# Patient Record
Sex: FEMALE | Race: WHITE | NOT HISPANIC OR LATINO | Employment: FULL TIME | URBAN - METROPOLITAN AREA
[De-identification: names, ages, dates, MRNs, and addresses within clinical notes are randomized per-mention and may not be internally consistent; named-entity substitution may affect disease eponyms.]

---

## 2017-01-16 ENCOUNTER — OFFICE VISIT (OUTPATIENT)
Dept: OBSTETRICS AND GYNECOLOGY | Facility: CLINIC | Age: 37
End: 2017-01-16
Payer: COMMERCIAL

## 2017-01-16 VITALS
BODY MASS INDEX: 28.29 KG/M2 | SYSTOLIC BLOOD PRESSURE: 122 MMHG | WEIGHT: 159.63 LBS | HEIGHT: 63 IN | DIASTOLIC BLOOD PRESSURE: 68 MMHG

## 2017-01-16 DIAGNOSIS — O26.899 VAGINAL DISCHARGE DURING PREGNANCY, UNSPECIFIED TRIMESTER: ICD-10-CM

## 2017-01-16 DIAGNOSIS — F41.9 ANXIETY: ICD-10-CM

## 2017-01-16 DIAGNOSIS — N89.8 VAGINAL DISCHARGE DURING PREGNANCY, UNSPECIFIED TRIMESTER: ICD-10-CM

## 2017-01-16 DIAGNOSIS — Z34.90 PREGNANCY, UNSPECIFIED GESTATIONAL AGE: Primary | ICD-10-CM

## 2017-01-16 DIAGNOSIS — R00.0 TACHYCARDIA: ICD-10-CM

## 2017-01-16 DIAGNOSIS — Z12.4 ROUTINE PAPANICOLAOU SMEAR: ICD-10-CM

## 2017-01-16 PROCEDURE — 80307 DRUG TEST PRSMV CHEM ANLYZR: CPT

## 2017-01-16 PROCEDURE — 87480 CANDIDA DNA DIR PROBE: CPT

## 2017-01-16 PROCEDURE — 99395 PREV VISIT EST AGE 18-39: CPT | Mod: S$GLB,,, | Performed by: OBSTETRICS & GYNECOLOGY

## 2017-01-16 PROCEDURE — 87086 URINE CULTURE/COLONY COUNT: CPT

## 2017-01-16 PROCEDURE — 87591 N.GONORRHOEAE DNA AMP PROB: CPT

## 2017-01-16 PROCEDURE — 88175 CYTOPATH C/V AUTO FLUID REDO: CPT

## 2017-01-16 PROCEDURE — 99999 PR PBB SHADOW E&M-EST. PATIENT-LVL III: CPT | Mod: PBBFAC,,, | Performed by: OBSTETRICS & GYNECOLOGY

## 2017-01-16 NOTE — PROGRESS NOTES
CC: Annual check-up    SUBJECTIVE:   36 y.o. female   for annual routine Pap and checkup. Patient's last menstrual period was 2016 (exact date).. EGA 9 wks and EDC is17.  She complains of Tachycardia with PVC's and had a - w/u, also has anxiety and insomnia.        Past Medical History   Diagnosis Date    Tachycardia      Past Surgical History   Procedure Laterality Date     section       x2    Tonsillectomy      Fatty tumor removal       Social History     Social History    Marital status:      Spouse name: N/A    Number of children: N/A    Years of education: N/A     Occupational History    Not on file.     Social History Main Topics    Smoking status: Never Smoker    Smokeless tobacco: Not on file    Alcohol use No      Comment: rarely    Drug use: No    Sexual activity: Yes     Partners: Male     Other Topics Concern    Not on file     Social History Narrative     Family History   Problem Relation Age of Onset    Heart disease Paternal Grandmother     Cancer Maternal Grandmother     Uterine cancer Maternal Grandmother     Hypertension Father     Hypertension Mother      OB History    Para Term  AB SAB TAB Ectopic Multiple Living   2 2 2             # Outcome Date GA Lbr Ford/2nd Weight Sex Delivery Anes PTL Lv   2 Term    3.345 kg (7 lb 6 oz) M CS-LTranv      1 Term    2.58 kg (5 lb 11 oz) M CS-LTranv               No current outpatient prescriptions on file.     No current facility-administered medications for this visit.      Allergies: Strawberries [strawberry] and Penicillins     ROS:  Constitutional: no weight loss, weight gain, fever, fatigue  Eyes:  No vision changes, glasses/contacts  ENT/Mouth: No ulcers, sinus problems, ears ringing, headache  Cardiovascular: No inability to lie flat, chest pain, exercise intolerance, swelling, heart palpitations  Respiratory: No wheezing, coughing blood, shortness of breath, or cough  Gastrointestinal:  "No diarrhea, bloody stool, nausea/vomiting, constipation, gas, hemorrhoids  Genitourinary: No blood in urine, painful urination, urgency of urination, frequency of urination, incomplete emptying, incontinence, abnormal bleeding, painful periods, heavy periods, vaginal discharge, vaginal odor, painful intercourse, sexual problems, bleeding after intercourse.  Musculoskeletal: No muscle weakness  Skin/Breast: No painful breasts, nipple discharge, masses, rash, ulcers  Neurological: No passing out, seizures, numbness, headache  Endocrine: No diabetes, hypothyroid, hyperthyroid, hot flashes, hair loss, abnormal hair growth, ance  Psychiatric: No depression, crying  Hematologic: No bruises, bleeding, swollen lymph nodes, anemia.      OBJECTIVE:   The patient appears well, alert, oriented x 3, in no distress.  Visit Vitals    /68    Ht 5' 3" (1.6 m)    Wt 72.4 kg (159 lb 9.6 oz)    LMP 11/14/2016 (Exact Date)    BMI 28.27 kg/m2     NECK: no thyromegaly, trachea midline  SKIN: no acne, striae, hirsutism  BREAST EXAM: breasts appear normal, no suspicious masses, no skin or nipple changes or axillary nodes  ABDOMEN: no hernias, masses, or hepatosplenomegaly  GENITALIA: normal external genitalia, no erythema, no discharge  URETHRA: normal urethra, normal urethral meatus  VAGINA: Normal  CERVIX: no lesions or cervical motion tenderness  UTERUS: enlarged, 10 weeks size  ADNEXA: normal adnexa      ASSESSMENT:   well woman  1. Pregnancy, unspecified gestational age    2. Routine Papanicolaou smear    3. Vaginal discharge during pregnancy, unspecified trimester    4. Tachycardia    5. Anxiety        PLAN:   pap smear  return after y/s  Orders Placed This Encounter    Urine culture    C. trachomatis/N. gonorrhoeae by AMP DNA Urine    Vaginosis Screen by DNA Probe    US OB Less Than 14 Wks First Gestation    Toxicology screen, urine    Ambulatory Referral to Cardiology    Holter monitor - 24 hour    POCT Urine " Pregnancy    Liquid-based pap smear, screening

## 2017-01-17 ENCOUNTER — HOSPITAL ENCOUNTER (OUTPATIENT)
Dept: RADIOLOGY | Facility: HOSPITAL | Age: 37
Discharge: HOME | End: 2017-01-17
Attending: OBSTETRICS & GYNECOLOGY
Payer: COMMERCIAL

## 2017-01-17 DIAGNOSIS — Z34.90 PREGNANCY, UNSPECIFIED GESTATIONAL AGE: ICD-10-CM

## 2017-01-17 LAB
AMPHET+METHAMPHET UR QL: NEGATIVE
BARBITURATES UR QL SCN>200 NG/ML: NEGATIVE
BENZODIAZ UR QL SCN>200 NG/ML: NEGATIVE
BZE UR QL SCN: NEGATIVE
CANDIDA RRNA VAG QL PROBE: NEGATIVE
CANNABINOIDS UR QL SCN: NEGATIVE
CREAT UR-MCNC: 162 MG/DL
ETHANOL UR-MCNC: <10 MG/DL
G VAGINALIS RRNA GENITAL QL PROBE: POSITIVE
METHADONE UR QL SCN>300 NG/ML: NEGATIVE
OPIATES UR QL SCN: NEGATIVE
PCP UR QL SCN>25 NG/ML: NEGATIVE
T VAGINALIS RRNA GENITAL QL PROBE: NEGATIVE
TOXICOLOGY INFORMATION: NORMAL

## 2017-01-17 PROCEDURE — 76801 OB US < 14 WKS SINGLE FETUS: CPT | Mod: TC,PO

## 2017-01-18 ENCOUNTER — ROUTINE PRENATAL (OUTPATIENT)
Dept: OBSTETRICS AND GYNECOLOGY | Facility: CLINIC | Age: 37
End: 2017-01-18
Payer: COMMERCIAL

## 2017-01-18 VITALS
DIASTOLIC BLOOD PRESSURE: 72 MMHG | SYSTOLIC BLOOD PRESSURE: 126 MMHG | WEIGHT: 158.38 LBS | BODY MASS INDEX: 28.06 KG/M2

## 2017-01-18 DIAGNOSIS — Z34.90 PREGNANCY, UNSPECIFIED GESTATIONAL AGE: Primary | ICD-10-CM

## 2017-01-18 DIAGNOSIS — Z3A.10 10 WEEKS GESTATION OF PREGNANCY: ICD-10-CM

## 2017-01-18 LAB
BACTERIA UR CULT: NORMAL
C TRACH DNA SPEC QL NAA+PROBE: NEGATIVE
N GONORRHOEA DNA SPEC QL NAA+PROBE: NEGATIVE

## 2017-01-18 PROCEDURE — 99999 PR PBB SHADOW E&M-EST. PATIENT-LVL II: CPT | Mod: PBBFAC,,, | Performed by: OBSTETRICS & GYNECOLOGY

## 2017-01-18 PROCEDURE — 0502F SUBSEQUENT PRENATAL CARE: CPT | Mod: S$GLB,,, | Performed by: OBSTETRICS & GYNECOLOGY

## 2017-01-18 NOTE — PROGRESS NOTES
Doing well, had u/s and lmp was 1 wk off, will use u/s dating, now IUP at 10 2/7, EDC 8/14/17  rtc 2 wks for Materni T 21  pnl  Discussed: Blood test ordered on first visit, Vitamins, folic acid, Wt Gain, Seafood and mercury, avoid deli food, unrefrigerated meats, cheeses and milk products, reason to avoid cat litter, the  accuracy of the EDUARDO, the value of an early TVS, risks of each trimester,  Aneuploidy screening, OTC medication in the first trimester, harmful effects of Smoking and ETOH, AFP screen at 15 weeks and Anatomy +/- MFM US at 18-20 weeks.  Discussed: Common complaints of pregnancy, HIV and other routine prenatal tests, Tobacco abuse, risk factors identified by prenatal history, Anticipated course of prenatal care, Nutrition and weight gain counseling,Toxoplasmosis precautions (Cats/Raw Meat) Exercise, Alcohol, Illicit/Recreational Drugs, Seat belt use, Childbirth classes/Hospital facilities.The counseling session lasted approximately 25 minutes, and all her questions were answered.

## 2017-01-19 ENCOUNTER — LAB VISIT (OUTPATIENT)
Dept: LAB | Facility: HOSPITAL | Age: 37
End: 2017-01-19
Attending: OBSTETRICS & GYNECOLOGY
Payer: COMMERCIAL

## 2017-01-19 DIAGNOSIS — Z34.90 PREGNANCY, UNSPECIFIED GESTATIONAL AGE: ICD-10-CM

## 2017-01-19 LAB
ABO + RH BLD: NORMAL
BASOPHILS # BLD AUTO: 0.01 K/UL
BASOPHILS NFR BLD: 0.2 %
BLD GP AB SCN CELLS X3 SERPL QL: NORMAL
DIFFERENTIAL METHOD: ABNORMAL
EOSINOPHIL # BLD AUTO: 0.1 K/UL
EOSINOPHIL NFR BLD: 1.3 %
ERYTHROCYTE [DISTWIDTH] IN BLOOD BY AUTOMATED COUNT: 15.3 %
HCT VFR BLD AUTO: 36.5 %
HGB BLD-MCNC: 11.7 G/DL
LYMPHOCYTES # BLD AUTO: 1.2 K/UL
LYMPHOCYTES NFR BLD: 26.1 %
MCH RBC QN AUTO: 25.7 PG
MCHC RBC AUTO-ENTMCNC: 32.1 %
MCV RBC AUTO: 80 FL
MONOCYTES # BLD AUTO: 0.4 K/UL
MONOCYTES NFR BLD: 9 %
NEUTROPHILS # BLD AUTO: 2.9 K/UL
NEUTROPHILS NFR BLD: 63 %
PLATELET # BLD AUTO: 209 K/UL
PMV BLD AUTO: 11.1 FL
RBC # BLD AUTO: 4.55 M/UL
WBC # BLD AUTO: 4.67 K/UL

## 2017-01-19 PROCEDURE — 85025 COMPLETE CBC W/AUTO DIFF WBC: CPT | Mod: PO

## 2017-01-19 PROCEDURE — 86850 RBC ANTIBODY SCREEN: CPT

## 2017-01-19 PROCEDURE — 86703 HIV-1/HIV-2 1 RESULT ANTBDY: CPT | Mod: PO

## 2017-01-19 PROCEDURE — 36415 COLL VENOUS BLD VENIPUNCTURE: CPT | Mod: PO

## 2017-01-19 PROCEDURE — 86900 BLOOD TYPING SEROLOGIC ABO: CPT

## 2017-01-19 PROCEDURE — 81220 CFTR GENE COM VARIANTS: CPT | Mod: PO

## 2017-01-19 PROCEDURE — 87340 HEPATITIS B SURFACE AG IA: CPT | Mod: PO

## 2017-01-19 PROCEDURE — 86762 RUBELLA ANTIBODY: CPT | Mod: PO

## 2017-01-19 PROCEDURE — 86592 SYPHILIS TEST NON-TREP QUAL: CPT | Mod: PO

## 2017-01-20 LAB
HBV SURFACE AG SERPL QL IA: NEGATIVE
HIV 1+2 AB+HIV1 P24 AG SERPL QL IA: NEGATIVE
RPR SER QL: NORMAL
RUBV IGG SER-ACNC: 14.5 IU/ML
RUBV IGG SER-IMP: REACTIVE

## 2017-01-23 LAB — CFTR MUT ANL BLD/T: NORMAL

## 2017-01-30 ENCOUNTER — ROUTINE PRENATAL (OUTPATIENT)
Dept: OBSTETRICS AND GYNECOLOGY | Facility: CLINIC | Age: 37
End: 2017-01-30
Payer: COMMERCIAL

## 2017-01-30 VITALS — SYSTOLIC BLOOD PRESSURE: 112 MMHG | BODY MASS INDEX: 28.34 KG/M2 | DIASTOLIC BLOOD PRESSURE: 68 MMHG | WEIGHT: 160 LBS

## 2017-01-30 DIAGNOSIS — Z3A.12 12 WEEKS GESTATION OF PREGNANCY: Primary | ICD-10-CM

## 2017-01-30 PROCEDURE — 0502F SUBSEQUENT PRENATAL CARE: CPT | Mod: S$GLB,,, | Performed by: OBSTETRICS & GYNECOLOGY

## 2017-01-30 PROCEDURE — 99999 PR PBB SHADOW E&M-EST. PATIENT-LVL I: CPT | Mod: PBBFAC,,, | Performed by: OBSTETRICS & GYNECOLOGY

## 2017-01-30 RX ORDER — METRONIDAZOLE 500 MG/1
500 TABLET ORAL 2 TIMES DAILY
Qty: 14 TABLET | Refills: 0 | Status: SHIPPED | OUTPATIENT
Start: 2017-01-30 | End: 2017-02-06

## 2017-02-08 ENCOUNTER — PATIENT MESSAGE (OUTPATIENT)
Dept: OBSTETRICS AND GYNECOLOGY | Facility: CLINIC | Age: 37
End: 2017-02-08

## 2017-02-14 ENCOUNTER — TELEPHONE (OUTPATIENT)
Dept: OBSTETRICS AND GYNECOLOGY | Facility: CLINIC | Age: 37
End: 2017-02-14

## 2017-02-14 NOTE — TELEPHONE ENCOUNTER
----- Message from [employer] sent at 2/14/2017  1:59 PM CST -----  Contact: [phone]/self   Pt would like to have test results from [facility] scanned and placed on her [facility] if possible.  Please advise

## 2017-02-27 ENCOUNTER — ROUTINE PRENATAL (OUTPATIENT)
Dept: OBSTETRICS AND GYNECOLOGY | Facility: CLINIC | Age: 37
End: 2017-02-27
Payer: COMMERCIAL

## 2017-02-27 VITALS — BODY MASS INDEX: 28.34 KG/M2 | SYSTOLIC BLOOD PRESSURE: 110 MMHG | DIASTOLIC BLOOD PRESSURE: 60 MMHG | WEIGHT: 160 LBS

## 2017-02-27 DIAGNOSIS — O09.522 AMA (ADVANCED MATERNAL AGE) MULTIGRAVIDA 35+, SECOND TRIMESTER: Primary | ICD-10-CM

## 2017-02-27 PROCEDURE — 0502F SUBSEQUENT PRENATAL CARE: CPT | Mod: S$GLB,,, | Performed by: OBSTETRICS & GYNECOLOGY

## 2017-02-27 PROCEDURE — 99999 PR PBB SHADOW E&M-EST. PATIENT-LVL I: CPT | Mod: PBBFAC,,, | Performed by: OBSTETRICS & GYNECOLOGY

## 2017-03-21 ENCOUNTER — OFFICE VISIT (OUTPATIENT)
Dept: MATERNAL FETAL MEDICINE | Facility: CLINIC | Age: 37
End: 2017-03-21
Attending: OBSTETRICS & GYNECOLOGY
Payer: COMMERCIAL

## 2017-03-21 VITALS — DIASTOLIC BLOOD PRESSURE: 84 MMHG | WEIGHT: 163.13 LBS | SYSTOLIC BLOOD PRESSURE: 126 MMHG | BODY MASS INDEX: 28.9 KG/M2

## 2017-03-21 DIAGNOSIS — O09.522 ELDERLY MULTIGRAVIDA IN SECOND TRIMESTER: ICD-10-CM

## 2017-03-21 DIAGNOSIS — O09.522 AMA (ADVANCED MATERNAL AGE) MULTIGRAVIDA 35+, SECOND TRIMESTER: ICD-10-CM

## 2017-03-21 PROCEDURE — 1160F RVW MEDS BY RX/DR IN RCRD: CPT | Mod: S$GLB,,, | Performed by: OBSTETRICS & GYNECOLOGY

## 2017-03-21 PROCEDURE — 99999 PR PBB SHADOW E&M-EST. PATIENT-LVL II: CPT | Mod: PBBFAC,,,

## 2017-03-21 PROCEDURE — 99203 OFFICE O/P NEW LOW 30 MIN: CPT | Mod: 25,S$GLB,, | Performed by: OBSTETRICS & GYNECOLOGY

## 2017-03-21 PROCEDURE — 76811 OB US DETAILED SNGL FETUS: CPT | Mod: S$GLB,,, | Performed by: OBSTETRICS & GYNECOLOGY

## 2017-03-21 RX ORDER — DIPHENHYDRAMINE HCL 25 MG
25 CAPSULE ORAL NIGHTLY PRN
COMMUNITY
End: 2017-08-14

## 2017-03-21 NOTE — LETTER
March 21, 2017      Jose Elias Wei MD  [address]  [address]  [facility] [address]           [facility]  [address] [address]  Phone: [phone]          Patient: Evie Bishop   MR Number: [id number]   YOB: 1980   Date of Visit: 3/21/2017       Dear Dr. Jose Elias Wei:    Thank you for referring Evie Bishop to me for evaluation. Attached you will find relevant portions of my assessment and plan of care.    If you have questions, please do not hesitate to call me. I look forward to following Evie Bishop along with you.    Sincerely,    Jaiden Dao MD    Enclosure  CC:  No Recipients    If you would like to receive this communication electronically, please contact [email or web address] or [phone] to request more information on [employer] access.    For providers and/or their staff who would like to refer a patient to [facility], please contact us through our one-stop-shop provider referral line, [facility] , at [phone].    If you feel you have received this communication in error or would no longer like to receive these types of communications, please e-mail [email or web address]

## 2017-03-21 NOTE — PROGRESS NOTES
Chief complaint: Advanced maternal age in pregnancy    Provider requesting consultation: Dr. Wei    Age based risk for Down syndrome at this gestational age is approximately 1 in 270    Aneuploidy screening this pregnancy via maternal serum free fetal DNA screening estimates the risk of Down syndrome to be less than 1 in 66685    A detailed fetal anatomical ultrasound was completed today.  See official report in the imaging section of [facility].  Ultrasound noted no obvious fetal abnormalities.  Ultrasound findings consistent with established dating.       After today's ultrasound assessment I quoted the patient a Down syndrome risk of less than 1 in 65855    Advanced maternal age counseling and recommendations:    Today I counseled the patient on the relationship between maternal age and genetic aneuploidy.  We discussed the risks and benefits of screening tests versus definitive genetic testing (amniocentesis). She was counseled about her specific age related risk of Down Syndrome. We discussed the limitations of ultrasound in the definitive diagnosis of Down Syndrome and we discussed amniocentesis as providing definitive diagnosis.  I quoted the patient a 1 in 500 procedure related risk of fetal loss with genetic amniocenetesis.  I also discussed with the patient the option of non-invasive prenatal testing (NIPT) (ex. Materni T21) including the sensitivity and specificity of the test.  Her questions were answered and after today's consultation she did not want to pursue amniocentesis.    Recommendations from our MFM group at [facility]:  -For women who are of advanced maternal age at the time of delivery we recommend a follow up fetal ultrasound at 32-34 weeks for interval fetal growth and well being (biophysical profile).

## 2017-04-03 ENCOUNTER — ROUTINE PRENATAL (OUTPATIENT)
Dept: OBSTETRICS AND GYNECOLOGY | Facility: CLINIC | Age: 37
End: 2017-04-03
Payer: COMMERCIAL

## 2017-04-03 VITALS — SYSTOLIC BLOOD PRESSURE: 132 MMHG | BODY MASS INDEX: 29.23 KG/M2 | DIASTOLIC BLOOD PRESSURE: 64 MMHG | WEIGHT: 165 LBS

## 2017-04-03 DIAGNOSIS — Z3A.21 21 WEEKS GESTATION OF PREGNANCY: Primary | ICD-10-CM

## 2017-04-03 PROCEDURE — 0502F SUBSEQUENT PRENATAL CARE: CPT | Mod: S$GLB,,, | Performed by: OBSTETRICS & GYNECOLOGY

## 2017-04-03 PROCEDURE — 99999 PR PBB SHADOW E&M-EST. PATIENT-LVL I: CPT | Mod: PBBFAC,,, | Performed by: OBSTETRICS & GYNECOLOGY

## 2017-04-26 ENCOUNTER — HOSPITAL ENCOUNTER (OUTPATIENT)
Dept: CARDIOLOGY | Facility: HOSPITAL | Age: 37
Discharge: HOME | End: 2017-04-26
Attending: OBSTETRICS & GYNECOLOGY
Payer: COMMERCIAL

## 2017-04-26 DIAGNOSIS — Z34.90 PREGNANCY, UNSPECIFIED GESTATIONAL AGE: ICD-10-CM

## 2017-04-26 PROCEDURE — 93227 XTRNL ECG REC<48 HR R&I: CPT | Mod: ,,, | Performed by: INTERNAL MEDICINE

## 2017-04-26 PROCEDURE — 93225 XTRNL ECG REC<48 HRS REC: CPT | Mod: PO

## 2017-05-01 ENCOUNTER — ROUTINE PRENATAL (OUTPATIENT)
Dept: OBSTETRICS AND GYNECOLOGY | Facility: CLINIC | Age: 37
End: 2017-05-01
Payer: COMMERCIAL

## 2017-05-01 VITALS — WEIGHT: 172 LBS | BODY MASS INDEX: 30.47 KG/M2 | SYSTOLIC BLOOD PRESSURE: 122 MMHG | DIASTOLIC BLOOD PRESSURE: 68 MMHG

## 2017-05-01 DIAGNOSIS — Z3A.25 25 WEEKS GESTATION OF PREGNANCY: Primary | ICD-10-CM

## 2017-05-01 PROCEDURE — 0502F SUBSEQUENT PRENATAL CARE: CPT | Mod: S$GLB,,, | Performed by: OBSTETRICS & GYNECOLOGY

## 2017-05-01 PROCEDURE — 99999 PR PBB SHADOW E&M-EST. PATIENT-LVL I: CPT | Mod: PBBFAC,,, | Performed by: OBSTETRICS & GYNECOLOGY

## 2017-05-16 ENCOUNTER — LAB VISIT (OUTPATIENT)
Dept: LAB | Facility: HOSPITAL | Age: 37
End: 2017-05-16
Attending: OBSTETRICS & GYNECOLOGY
Payer: COMMERCIAL

## 2017-05-16 DIAGNOSIS — Z3A.25 25 WEEKS GESTATION OF PREGNANCY: ICD-10-CM

## 2017-05-16 LAB — GLUCOSE SERPL-MCNC: 106 MG/DL

## 2017-05-16 PROCEDURE — 82950 GLUCOSE TEST: CPT | Mod: PO

## 2017-05-16 PROCEDURE — 36415 COLL VENOUS BLD VENIPUNCTURE: CPT | Mod: PO

## 2017-05-22 ENCOUNTER — CLINICAL SUPPORT (OUTPATIENT)
Dept: OBSTETRICS AND GYNECOLOGY | Facility: CLINIC | Age: 37
End: 2017-05-22
Payer: COMMERCIAL

## 2017-05-22 ENCOUNTER — ROUTINE PRENATAL (OUTPATIENT)
Dept: OBSTETRICS AND GYNECOLOGY | Facility: CLINIC | Age: 37
End: 2017-05-22
Payer: COMMERCIAL

## 2017-05-22 VITALS — WEIGHT: 175 LBS | BODY MASS INDEX: 31 KG/M2 | DIASTOLIC BLOOD PRESSURE: 74 MMHG | SYSTOLIC BLOOD PRESSURE: 122 MMHG

## 2017-05-22 DIAGNOSIS — O26.892 RH NEGATIVE, ANTEPARTUM, SECOND TRIMESTER: ICD-10-CM

## 2017-05-22 DIAGNOSIS — Z3A.28 28 WEEKS GESTATION OF PREGNANCY: Primary | ICD-10-CM

## 2017-05-22 DIAGNOSIS — O26.893 RH NEGATIVE STATUS DURING PREGNANCY, THIRD TRIMESTER: Primary | ICD-10-CM

## 2017-05-22 DIAGNOSIS — O09.523 AMA (ADVANCED MATERNAL AGE) MULTIGRAVIDA 35+, THIRD TRIMESTER: ICD-10-CM

## 2017-05-22 DIAGNOSIS — Z67.91 RH NEGATIVE, ANTEPARTUM, SECOND TRIMESTER: ICD-10-CM

## 2017-05-22 DIAGNOSIS — Z67.91 RH NEGATIVE STATUS DURING PREGNANCY, THIRD TRIMESTER: Primary | ICD-10-CM

## 2017-05-22 PROCEDURE — 99999 PR PBB SHADOW E&M-EST. PATIENT-LVL I: CPT | Mod: PBBFAC,,, | Performed by: OBSTETRICS & GYNECOLOGY

## 2017-05-22 PROCEDURE — 0502F SUBSEQUENT PRENATAL CARE: CPT | Mod: S$GLB,,, | Performed by: OBSTETRICS & GYNECOLOGY

## 2017-05-22 PROCEDURE — 96372 THER/PROPH/DIAG INJ SC/IM: CPT | Mod: S$GLB,,, | Performed by: OBSTETRICS & GYNECOLOGY

## 2017-05-22 NOTE — PROGRESS NOTES
Rhogam 300 mcg administered IM in the right upper outer gluteal as ordered by Dr. Jose Elias Wei. Pt tolerated with no complaints. Pt has had medication before with no problems and is aware of why she needs it, reeducated , no further questions.     Lot: [id number]  Exp:10/23/2018  Ndc:[id number]  Manu: [employer]    
,DirectAddress_Unknown,DirectAddress_Unknown

## 2017-06-13 ENCOUNTER — PATIENT MESSAGE (OUTPATIENT)
Dept: OBSTETRICS AND GYNECOLOGY | Facility: CLINIC | Age: 37
End: 2017-06-13

## 2017-06-15 ENCOUNTER — OFFICE VISIT (OUTPATIENT)
Dept: CARDIOLOGY | Facility: CLINIC | Age: 37
End: 2017-06-15
Payer: COMMERCIAL

## 2017-06-15 VITALS
BODY MASS INDEX: 31.87 KG/M2 | DIASTOLIC BLOOD PRESSURE: 74 MMHG | WEIGHT: 179.88 LBS | HEART RATE: 97 BPM | SYSTOLIC BLOOD PRESSURE: 116 MMHG | HEIGHT: 63 IN

## 2017-06-15 DIAGNOSIS — R00.0 TACHYCARDIA: ICD-10-CM

## 2017-06-15 DIAGNOSIS — R00.2 PALPITATIONS: Primary | ICD-10-CM

## 2017-06-15 PROCEDURE — 99999 PR PBB SHADOW E&M-EST. PATIENT-LVL III: CPT | Mod: PBBFAC,,, | Performed by: INTERNAL MEDICINE

## 2017-06-15 PROCEDURE — 99204 OFFICE O/P NEW MOD 45 MIN: CPT | Mod: S$GLB,,, | Performed by: INTERNAL MEDICINE

## 2017-06-15 RX ORDER — ZOLPIDEM TARTRATE 5 MG/1
10 TABLET ORAL NIGHTLY PRN
COMMUNITY
End: 2017-07-06 | Stop reason: SDUPTHER

## 2017-06-15 NOTE — PROGRESS NOTES
"Subjective:    Patient ID:  Evie Bishop is a 36 y.o. female who presents for evaluation of Tachycardia      HPI  37 y/o female with hx of palpitations with w/u including Holter and stress test (normal) that showed sinus tach (placed on metoprolol and subsequently on propranolol with resolution of symptoms) who presents for evaluation of palpitations. She has been off BB due to currently being pregnant and symptoms have returned. She has intermittent palpitations described as "heart racing" that resolves many times with a "thump" and then back to normal rhythm. She has some associated SOB with the episodes. Denies CP, orthopnea, PND, syncope.     Review of Systems   Constitution: Negative for weakness and malaise/fatigue.   HENT: Negative for congestion and headaches.    Eyes: Negative for blurred vision.   Cardiovascular: Positive for leg swelling and palpitations. Negative for chest pain, claudication, cyanosis, dyspnea on exertion, irregular heartbeat, near-syncope, orthopnea, paroxysmal nocturnal dyspnea and syncope.   Respiratory: Negative for shortness of breath.    Endocrine: Negative for polyuria.   Hematologic/Lymphatic: Negative for bleeding problem.   Skin: Negative for itching and rash.   Musculoskeletal: Negative for joint swelling, muscle cramps and muscle weakness.   Gastrointestinal: Negative for abdominal pain, hematemesis, hematochezia, melena, nausea and vomiting.   Genitourinary: Negative for dysuria and hematuria.   Neurological: Negative for dizziness, focal weakness, light-headedness and loss of balance.   Psychiatric/Behavioral: Negative for depression. The patient is not nervous/anxious.         Objective:    Physical Exam   Constitutional: She is oriented to person, place, and time. She appears well-developed and well-nourished.   HENT:   Head: Normocephalic and atraumatic.   Neck: Neck supple. No JVD present.   Cardiovascular: Normal rate and regular rhythm.    Murmur heard.   Systolic " murmur is present with a grade of 1/6   Pulses:       Carotid pulses are 2+ on the right side, and 2+ on the left side.       Radial pulses are 2+ on the right side, and 2+ on the left side.        Femoral pulses are 2+ on the right side, and 2+ on the left side.       Dorsalis pedis pulses are 2+ on the right side, and 2+ on the left side.        Posterior tibial pulses are 2+ on the right side, and 2+ on the left side.   Pulmonary/Chest: Effort normal and breath sounds normal.   Abdominal: Soft. Bowel sounds are normal.   Musculoskeletal: She exhibits edema.   Neurological: She is alert and oriented to person, place, and time.   Skin: Skin is warm and dry.   Psychiatric: She has a normal mood and affect. Her behavior is normal. Thought content normal.         Assessment:       1. Palpitations    2. Tachycardia      37 y/o female with hx and presentation as above. Hx of sinus tach with palps, improved with BB. She is symptomatic with the palpitations and may be inappropriate sinus tach vs SVT. We discussed positional vagal maneuvers to attempt when she has them and may benefit from restarting BB. Will message Dr Wei to see if safe to restart BB at this time. Previous ECG reviewed and no evidence of pre-excitation or prolonged QT.        Plan:       -As above   -F/u in 6 months

## 2017-06-20 ENCOUNTER — HOSPITAL ENCOUNTER (OUTPATIENT)
Dept: RADIOLOGY | Facility: HOSPITAL | Age: 37
Discharge: HOME | End: 2017-06-20
Attending: OBSTETRICS & GYNECOLOGY
Payer: COMMERCIAL

## 2017-06-20 DIAGNOSIS — O09.523 AMA (ADVANCED MATERNAL AGE) MULTIGRAVIDA 35+, THIRD TRIMESTER: ICD-10-CM

## 2017-06-20 PROCEDURE — 76805 OB US >/= 14 WKS SNGL FETUS: CPT | Mod: TC,PO

## 2017-06-21 ENCOUNTER — ROUTINE PRENATAL (OUTPATIENT)
Dept: OBSTETRICS AND GYNECOLOGY | Facility: CLINIC | Age: 37
End: 2017-06-21
Payer: COMMERCIAL

## 2017-06-21 ENCOUNTER — HOSPITAL ENCOUNTER (OUTPATIENT)
Facility: HOSPITAL | Age: 37
Discharge: HOME | End: 2017-06-21
Attending: FAMILY MEDICINE | Admitting: OBSTETRICS & GYNECOLOGY
Payer: COMMERCIAL

## 2017-06-21 VITALS
RESPIRATION RATE: 18 BRPM | WEIGHT: 180 LBS | SYSTOLIC BLOOD PRESSURE: 144 MMHG | BODY MASS INDEX: 31.89 KG/M2 | TEMPERATURE: 98 F | DIASTOLIC BLOOD PRESSURE: 74 MMHG | SYSTOLIC BLOOD PRESSURE: 126 MMHG | HEART RATE: 91 BPM | DIASTOLIC BLOOD PRESSURE: 65 MMHG | BODY MASS INDEX: 31.89 KG/M2 | OXYGEN SATURATION: 98 % | WEIGHT: 180 LBS | HEIGHT: 63 IN

## 2017-06-21 DIAGNOSIS — Z3A.32 32 WEEKS GESTATION OF PREGNANCY: ICD-10-CM
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
## 2017-06-21 DIAGNOSIS — Z3A.32 32 WEEKS GESTATION OF PREGNANCY: Primary | ICD-10-CM

## 2017-06-21 LAB
ALBUMIN SERPL BCP-MCNC: 3.8 G/DL
ALP SERPL-CCNC: 126 U/L
ALT SERPL W/O P-5'-P-CCNC: 24 U/L
ANION GAP SERPL CALC-SCNC: 11 MMOL/L
APTT BLDCRRT: 22.8 SEC
AST SERPL-CCNC: 17 U/L
BACTERIA #/AREA URNS AUTO: NORMAL /HPF
BASOPHILS # BLD AUTO: 0.02 K/UL
BASOPHILS NFR BLD: 0.2 %
BILIRUB SERPL-MCNC: 0.5 MG/DL
BILIRUB UR QL STRIP: NEGATIVE
BUN SERPL-MCNC: 9 MG/DL
CALCIUM SERPL-MCNC: 9.5 MG/DL
CHLORIDE SERPL-SCNC: 104 MMOL/L
CLARITY UR REFRACT.AUTO: ABNORMAL
CO2 SERPL-SCNC: 22 MMOL/L
COLOR UR AUTO: YELLOW
CREAT SERPL-MCNC: 0.59 MG/DL
DIFFERENTIAL METHOD: ABNORMAL
EOSINOPHIL # BLD AUTO: 0.1 K/UL
EOSINOPHIL NFR BLD: 0.8 %
ERYTHROCYTE [DISTWIDTH] IN BLOOD BY AUTOMATED COUNT: 16.7 %
EST. GFR  (AFRICAN AMERICAN): >60 ML/MIN/1.73 M^2
EST. GFR  (NON AFRICAN AMERICAN): >60 ML/MIN/1.73 M^2
GLUCOSE SERPL-MCNC: 97 MG/DL
GLUCOSE UR QL STRIP: NEGATIVE
HCT VFR BLD AUTO: 31.5 %
HGB BLD-MCNC: 9.6 G/DL
HGB UR QL STRIP: NEGATIVE
HYALINE CASTS UR QL AUTO: 0 /LPF
INR PPP: 1
KETONES UR QL STRIP: NEGATIVE
LEUKOCYTE ESTERASE UR QL STRIP: NEGATIVE
LYMPHOCYTES # BLD AUTO: 1.6 K/UL
LYMPHOCYTES NFR BLD: 18.6 %
MCH RBC QN AUTO: 21.6 PG
MCHC RBC AUTO-ENTMCNC: 30.5 %
MCV RBC AUTO: 71 FL
MICROSCOPIC COMMENT: NORMAL
MONOCYTES # BLD AUTO: 0.7 K/UL
MONOCYTES NFR BLD: 8 %
NEUTROPHILS # BLD AUTO: 6.3 K/UL
NEUTROPHILS NFR BLD: 71.7 %
NITRITE UR QL STRIP: NEGATIVE
PH UR STRIP: 6 [PH] (ref 5–8)
PLATELET # BLD AUTO: 212 K/UL
PMV BLD AUTO: 11 FL
POTASSIUM SERPL-SCNC: 4 MMOL/L
PROT SERPL-MCNC: 6.7 G/DL
PROT UR QL STRIP: ABNORMAL
PROTHROMBIN TIME: 11.2 SEC
RBC # BLD AUTO: 4.44 M/UL
RBC #/AREA URNS AUTO: 0 /HPF (ref 0–4)
SODIUM SERPL-SCNC: 137 MMOL/L
SP GR UR STRIP: 1.02 (ref 1–1.03)
URN SPEC COLLECT METH UR: ABNORMAL
UROBILINOGEN UR STRIP-ACNC: NEGATIVE EU/DL
WBC # BLD AUTO: 8.75 K/UL
WBC #/AREA URNS AUTO: 1 /HPF (ref 0–5)

## 2017-06-21 PROCEDURE — 99284 EMERGENCY DEPT VISIT MOD MDM: CPT | Mod: 25,27,PO

## 2017-06-21 PROCEDURE — 85610 PROTHROMBIN TIME: CPT | Mod: PO

## 2017-06-21 PROCEDURE — 0502F SUBSEQUENT PRENATAL CARE: CPT | Mod: S$GLB,,, | Performed by: OBSTETRICS & GYNECOLOGY

## 2017-06-21 PROCEDURE — 99999 PR PBB SHADOW E&M-EST. PATIENT-LVL I: CPT | Mod: PBBFAC,,, | Performed by: OBSTETRICS & GYNECOLOGY

## 2017-06-21 PROCEDURE — 96360 HYDRATION IV INFUSION INIT: CPT | Mod: PO

## 2017-06-21 PROCEDURE — 25000003 PHARM REV CODE 250: Performed by: FAMILY MEDICINE

## 2017-06-21 PROCEDURE — 81000 URINALYSIS NONAUTO W/SCOPE: CPT | Mod: PO

## 2017-06-21 PROCEDURE — 99211 OFF/OP EST MAY X REQ PHY/QHP: CPT | Mod: 25

## 2017-06-21 PROCEDURE — 85025 COMPLETE CBC W/AUTO DIFF WBC: CPT | Mod: PO

## 2017-06-21 PROCEDURE — 85730 THROMBOPLASTIN TIME PARTIAL: CPT | Mod: PO

## 2017-06-21 PROCEDURE — 80053 COMPREHEN METABOLIC PANEL: CPT | Mod: PO

## 2017-06-21 RX ORDER — ACETAMINOPHEN 500 MG
500 TABLET ORAL EVERY 6 HOURS PRN
Status: DISCONTINUED | OUTPATIENT
Start: 2017-06-21 | End: 2017-06-21 | Stop reason: HOSPADM

## 2017-06-21 RX ORDER — ONDANSETRON 8 MG/1
8 TABLET, ORALLY DISINTEGRATING ORAL EVERY 8 HOURS PRN
Status: DISCONTINUED | OUTPATIENT
Start: 2017-06-21 | End: 2017-06-21 | Stop reason: HOSPADM

## 2017-06-21 RX ORDER — SODIUM CHLORIDE 9 MG/ML
1000 INJECTION, SOLUTION INTRAVENOUS
Status: COMPLETED | OUTPATIENT
Start: 2017-06-21 | End: 2017-06-21

## 2017-06-21 RX ADMIN — SODIUM CHLORIDE 1000 ML: 0.9 INJECTION, SOLUTION INTRAVENOUS at 05:06

## 2017-06-21 NOTE — PROGRESS NOTES
Doing ok, doesn't want to start B blocker yet  Sx's infrequent and is nervous about fetal effects even though it would be minmal    's  Monitor sx's rtc 2 wks
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
## 2017-06-22 ENCOUNTER — TELEPHONE (OUTPATIENT)
Dept: OBSTETRICS AND GYNECOLOGY | Facility: CLINIC | Age: 37
End: 2017-06-22

## 2017-06-22 NOTE — DISCHARGE INSTRUCTIONS
Drink 8-10 glasses of water a day. Rest as needed. May take 650mg of tylenol every 6 hours for pain. Perform kick count tomorrow. Return to hospital with any heavy bleeding, leaking of fluid or ctx 2-4 minutes apart for one hour. Call Dr. Thornton's office with any questions and schedule appointment for next week.
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
## 2017-06-26 ENCOUNTER — ROUTINE PRENATAL (OUTPATIENT)
Dept: OBSTETRICS AND GYNECOLOGY | Facility: CLINIC | Age: 37
End: 2017-06-26
Payer: COMMERCIAL

## 2017-06-26 VITALS — SYSTOLIC BLOOD PRESSURE: 126 MMHG | WEIGHT: 181 LBS | BODY MASS INDEX: 32.06 KG/M2 | DIASTOLIC BLOOD PRESSURE: 78 MMHG

## 2017-06-26 DIAGNOSIS — Z3A.33 33 WEEKS GESTATION OF PREGNANCY: Primary | ICD-10-CM

## 2017-06-26 PROCEDURE — 0502F SUBSEQUENT PRENATAL CARE: CPT | Mod: S$GLB,,, | Performed by: OBSTETRICS & GYNECOLOGY

## 2017-06-26 PROCEDURE — 99999 PR PBB SHADOW E&M-EST. PATIENT-LVL I: CPT | Mod: PBBFAC,,, | Performed by: OBSTETRICS & GYNECOLOGY

## 2017-06-26 NOTE — PROGRESS NOTES
Doing well, mild anxiety, +fm  No desire for anxiolytic or B Blocker  fht's 140's  rtc 3 wks  fmc bid

## 2017-07-06 ENCOUNTER — PATIENT MESSAGE (OUTPATIENT)
Dept: OBSTETRICS AND GYNECOLOGY | Facility: CLINIC | Age: 37
End: 2017-07-06

## 2017-07-07 ENCOUNTER — ROUTINE PRENATAL (OUTPATIENT)
Dept: OBSTETRICS AND GYNECOLOGY | Facility: CLINIC | Age: 37
End: 2017-07-07
Payer: COMMERCIAL

## 2017-07-07 VITALS — WEIGHT: 180 LBS | SYSTOLIC BLOOD PRESSURE: 122 MMHG | DIASTOLIC BLOOD PRESSURE: 82 MMHG | BODY MASS INDEX: 31.89 KG/M2

## 2017-07-07 DIAGNOSIS — Z3A.34 34 WEEKS GESTATION OF PREGNANCY: Primary | ICD-10-CM

## 2017-07-07 PROCEDURE — 99999 PR PBB SHADOW E&M-EST. PATIENT-LVL II: CPT | Mod: PBBFAC,,, | Performed by: OBSTETRICS & GYNECOLOGY

## 2017-07-07 PROCEDURE — 0502F SUBSEQUENT PRENATAL CARE: CPT | Mod: S$GLB,,, | Performed by: OBSTETRICS & GYNECOLOGY

## 2017-07-07 RX ORDER — ZOLPIDEM TARTRATE 5 MG/1
5 TABLET ORAL NIGHTLY PRN
Qty: 20 TABLET | Refills: 1 | Status: SHIPPED | OUTPATIENT
Start: 2017-07-07 | End: 2017-08-14

## 2017-07-14 ENCOUNTER — TELEPHONE (OUTPATIENT)
Dept: OBSTETRICS AND GYNECOLOGY | Facility: CLINIC | Age: 37
End: 2017-07-14

## 2017-07-14 ENCOUNTER — ROUTINE PRENATAL (OUTPATIENT)
Dept: OBSTETRICS AND GYNECOLOGY | Facility: CLINIC | Age: 37
End: 2017-07-14
Payer: COMMERCIAL

## 2017-07-14 VITALS — SYSTOLIC BLOOD PRESSURE: 118 MMHG | DIASTOLIC BLOOD PRESSURE: 62 MMHG | BODY MASS INDEX: 32.06 KG/M2 | WEIGHT: 181 LBS

## 2017-07-14 DIAGNOSIS — Z3A.35 35 WEEKS GESTATION OF PREGNANCY: Primary | ICD-10-CM

## 2017-07-14 DIAGNOSIS — Z3A.39 39 WEEKS GESTATION OF PREGNANCY: Primary | ICD-10-CM

## 2017-07-14 DIAGNOSIS — Z3A.39 39 WEEKS GESTATION OF PREGNANCY: ICD-10-CM

## 2017-07-14 PROCEDURE — 99999 PR PBB SHADOW E&M-EST. PATIENT-LVL II: CPT | Mod: PBBFAC,,, | Performed by: OBSTETRICS & GYNECOLOGY

## 2017-07-14 PROCEDURE — 0502F SUBSEQUENT PRENATAL CARE: CPT | Mod: S$GLB,,, | Performed by: OBSTETRICS & GYNECOLOGY

## 2017-07-14 PROCEDURE — 87081 CULTURE SCREEN ONLY: CPT

## 2017-07-14 NOTE — PROGRESS NOTES
Doing well.   +fetal movement  No beta blocker. HR 95  -150s  No vaginal bleeding or fluid leakage  GBS today  Cervix check  Rpt C/S w/ tubal ligation 8/8/17

## 2017-07-17 LAB — BACTERIA SPEC AEROBE CULT: NORMAL

## 2017-07-24 ENCOUNTER — ROUTINE PRENATAL (OUTPATIENT)
Dept: OBSTETRICS AND GYNECOLOGY | Facility: CLINIC | Age: 37
End: 2017-07-24
Payer: COMMERCIAL

## 2017-07-24 VITALS — BODY MASS INDEX: 32.06 KG/M2 | WEIGHT: 181 LBS | SYSTOLIC BLOOD PRESSURE: 118 MMHG | DIASTOLIC BLOOD PRESSURE: 74 MMHG

## 2017-07-24 DIAGNOSIS — Z3A.37 37 WEEKS GESTATION OF PREGNANCY: Primary | ICD-10-CM

## 2017-07-24 PROCEDURE — 0502F SUBSEQUENT PRENATAL CARE: CPT | Mod: S$GLB,,, | Performed by: OBSTETRICS & GYNECOLOGY

## 2017-07-24 PROCEDURE — 99999 PR PBB SHADOW E&M-EST. PATIENT-LVL II: CPT | Mod: PBBFAC,,, | Performed by: OBSTETRICS & GYNECOLOGY

## 2017-07-31 ENCOUNTER — ROUTINE PRENATAL (OUTPATIENT)
Dept: OBSTETRICS AND GYNECOLOGY | Facility: CLINIC | Age: 37
End: 2017-07-31
Payer: COMMERCIAL

## 2017-07-31 VITALS — DIASTOLIC BLOOD PRESSURE: 74 MMHG | SYSTOLIC BLOOD PRESSURE: 110 MMHG | WEIGHT: 180 LBS | BODY MASS INDEX: 31.89 KG/M2

## 2017-07-31 DIAGNOSIS — Z3A.38 38 WEEKS GESTATION OF PREGNANCY: Primary | ICD-10-CM

## 2017-07-31 PROCEDURE — 99999 PR PBB SHADOW E&M-EST. PATIENT-LVL I: CPT | Mod: PBBFAC,,, | Performed by: OBSTETRICS & GYNECOLOGY

## 2017-07-31 PROCEDURE — 0502F SUBSEQUENT PRENATAL CARE: CPT | Mod: S$GLB,,, | Performed by: OBSTETRICS & GYNECOLOGY

## 2017-08-01 ENCOUNTER — TELEPHONE (OUTPATIENT)
Dept: OBSTETRICS AND GYNECOLOGY | Facility: CLINIC | Age: 37
End: 2017-08-01

## 2017-08-03 ENCOUNTER — ROUTINE PRENATAL (OUTPATIENT)
Dept: OBSTETRICS AND GYNECOLOGY | Facility: CLINIC | Age: 37
End: 2017-08-03
Payer: COMMERCIAL

## 2017-08-03 VITALS
BODY MASS INDEX: 31.87 KG/M2 | WEIGHT: 179.88 LBS | DIASTOLIC BLOOD PRESSURE: 68 MMHG | SYSTOLIC BLOOD PRESSURE: 116 MMHG

## 2017-08-03 DIAGNOSIS — Z3A.38 38 WEEKS GESTATION OF PREGNANCY: Primary | ICD-10-CM

## 2017-08-03 PROCEDURE — 99999 PR PBB SHADOW E&M-EST. PATIENT-LVL II: CPT | Mod: PBBFAC,,, | Performed by: OBSTETRICS & GYNECOLOGY

## 2017-08-03 PROCEDURE — 0502F SUBSEQUENT PRENATAL CARE: CPT | Mod: S$GLB,,, | Performed by: OBSTETRICS & GYNECOLOGY

## 2017-08-07 ENCOUNTER — ROUTINE PRENATAL (OUTPATIENT)
Dept: OBSTETRICS AND GYNECOLOGY | Facility: CLINIC | Age: 37
End: 2017-08-07
Payer: COMMERCIAL

## 2017-08-07 ENCOUNTER — ANESTHESIA EVENT (OUTPATIENT)
Dept: OBSTETRICS AND GYNECOLOGY | Facility: HOSPITAL | Age: 37
End: 2017-08-07
Payer: COMMERCIAL

## 2017-08-07 VITALS
SYSTOLIC BLOOD PRESSURE: 122 MMHG | DIASTOLIC BLOOD PRESSURE: 66 MMHG | WEIGHT: 180.19 LBS | BODY MASS INDEX: 31.92 KG/M2

## 2017-08-07 DIAGNOSIS — Z3A.39 39 WEEKS GESTATION OF PREGNANCY: ICD-10-CM

## 2017-08-07 DIAGNOSIS — F41.9 ANXIETY AND DEPRESSION: Primary | ICD-10-CM

## 2017-08-07 DIAGNOSIS — F32.A ANXIETY AND DEPRESSION: Primary | ICD-10-CM

## 2017-08-07 PROCEDURE — 99999 PR PBB SHADOW E&M-EST. PATIENT-LVL II: CPT | Mod: PBBFAC,,, | Performed by: OBSTETRICS & GYNECOLOGY

## 2017-08-07 PROCEDURE — 0502F SUBSEQUENT PRENATAL CARE: CPT | Mod: S$GLB,,, | Performed by: OBSTETRICS & GYNECOLOGY

## 2017-08-07 RX ORDER — BUSPIRONE HYDROCHLORIDE 7.5 MG/1
7.5 TABLET ORAL 3 TIMES DAILY
Qty: 5 TABLET | Refills: 0 | Status: SHIPPED | OUTPATIENT
Start: 2017-08-07 | End: 2017-08-14

## 2017-08-08 ENCOUNTER — ANESTHESIA (OUTPATIENT)
Dept: OBSTETRICS AND GYNECOLOGY | Facility: HOSPITAL | Age: 37
End: 2017-08-08
Payer: COMMERCIAL

## 2017-08-08 ENCOUNTER — HOSPITAL ENCOUNTER (INPATIENT)
Facility: HOSPITAL | Age: 37
LOS: 2 days | Discharge: HOME | End: 2017-08-10
Attending: OBSTETRICS & GYNECOLOGY | Admitting: OBSTETRICS & GYNECOLOGY
Payer: COMMERCIAL

## 2017-08-08 DIAGNOSIS — O34.219 PREVIOUS CESAREAN DELIVERY AFFECTING PREGNANCY: ICD-10-CM

## 2017-08-08 LAB
ABO + RH BLD: NORMAL
ALLENS TEST: ABNORMAL
ALLENS TEST: ABNORMAL
ANISOCYTOSIS BLD QL SMEAR: SLIGHT
BASOPHILS # BLD AUTO: 0.01 K/UL
BASOPHILS NFR BLD: 0.1 %
BLD GP AB SCN CELLS X3 SERPL QL: NORMAL
BLOOD GROUP ANTIBODIES SERPL: NORMAL
DAT IGG-SP REAG RBC-IMP: NORMAL
DIFFERENTIAL METHOD: ABNORMAL
EOSINOPHIL # BLD AUTO: 0 K/UL
EOSINOPHIL NFR BLD: 0.4 %
ERYTHROCYTE [DISTWIDTH] IN BLOOD BY AUTOMATED COUNT: 17.7 %
HCO3 UR-SCNC: 23.6 MMOL/L (ref 24–28)
HCO3 UR-SCNC: 28.2 MMOL/L (ref 24–28)
HCT VFR BLD AUTO: 31.9 %
HGB BLD-MCNC: 9.4 G/DL
HYPOCHROMIA BLD QL SMEAR: ABNORMAL
LYMPHOCYTES # BLD AUTO: 1.2 K/UL
LYMPHOCYTES NFR BLD: 17.1 %
MCH RBC QN AUTO: 20 PG
MCHC RBC AUTO-ENTMCNC: 29.5 G/DL
MCV RBC AUTO: 68 FL
MONOCYTES # BLD AUTO: 0.5 K/UL
MONOCYTES NFR BLD: 7.2 %
NEUTROPHILS # BLD AUTO: 5.4 K/UL
NEUTROPHILS NFR BLD: 75.2 %
PCO2 BLDA: 51.4 MMHG (ref 35–45)
PCO2 BLDA: 64 MMHG (ref 35–45)
PH SMN: 7.25 [PH] (ref 7.35–7.45)
PH SMN: 7.27 [PH] (ref 7.35–7.45)
PLATELET # BLD AUTO: 218 K/UL
PLATELET BLD QL SMEAR: ABNORMAL
PMV BLD AUTO: 10.3 FL
PO2 BLDA: 19 MMHG (ref 80–100)
PO2 BLDA: 8 MMHG (ref 80–100)
POC BE: -3 MMOL/L
POC BE: 1 MMOL/L
POC SATURATED O2: 23 % (ref 95–100)
POC SATURATED O2: 5 % (ref 95–100)
POC TCO2: 25 MMOL/L (ref 23–27)
POC TCO2: 30 MMOL/L (ref 23–27)
POIKILOCYTOSIS BLD QL SMEAR: SLIGHT
POLYCHROMASIA BLD QL SMEAR: ABNORMAL
RBC # BLD AUTO: 4.7 M/UL
SAMPLE: ABNORMAL
SAMPLE: ABNORMAL
SITE: ABNORMAL
SITE: ABNORMAL
WBC # BLD AUTO: 7.26 K/UL

## 2017-08-08 PROCEDURE — 86850 RBC ANTIBODY SCREEN: CPT

## 2017-08-08 PROCEDURE — 88302 TISSUE EXAM BY PATHOLOGIST: CPT | Mod: 26,,, | Performed by: PATHOLOGY

## 2017-08-08 PROCEDURE — S0077 INJECTION, CLINDAMYCIN PHOSP: HCPCS | Performed by: OBSTETRICS & GYNECOLOGY

## 2017-08-08 PROCEDURE — 86900 BLOOD TYPING SEROLOGIC ABO: CPT

## 2017-08-08 PROCEDURE — 25000003 PHARM REV CODE 250: Performed by: OBSTETRICS & GYNECOLOGY

## 2017-08-08 PROCEDURE — 51702 INSERT TEMP BLADDER CATH: CPT

## 2017-08-08 PROCEDURE — 86592 SYPHILIS TEST NON-TREP QUAL: CPT

## 2017-08-08 PROCEDURE — 27201121 HC TRAY,SPINAL-HYPERBARIC: Performed by: STUDENT IN AN ORGANIZED HEALTH CARE EDUCATION/TRAINING PROGRAM

## 2017-08-08 PROCEDURE — 11000001 HC ACUTE MED/SURG PRIVATE ROOM

## 2017-08-08 PROCEDURE — 37000008 HC ANESTHESIA 1ST 15 MINUTES: Performed by: OBSTETRICS & GYNECOLOGY

## 2017-08-08 PROCEDURE — 58611 LIGATE OVIDUCT(S) ADD-ON: CPT | Mod: ,,, | Performed by: OBSTETRICS & GYNECOLOGY

## 2017-08-08 PROCEDURE — 86870 RBC ANTIBODY IDENTIFICATION: CPT

## 2017-08-08 PROCEDURE — 25000003 PHARM REV CODE 250: Performed by: STUDENT IN AN ORGANIZED HEALTH CARE EDUCATION/TRAINING PROGRAM

## 2017-08-08 PROCEDURE — 59510 CESAREAN DELIVERY: CPT | Mod: GB,,, | Performed by: OBSTETRICS & GYNECOLOGY

## 2017-08-08 PROCEDURE — 0UB70ZZ EXCISION OF BILATERAL FALLOPIAN TUBES, OPEN APPROACH: ICD-10-PCS | Performed by: OBSTETRICS & GYNECOLOGY

## 2017-08-08 PROCEDURE — 94799 UNLISTED PULMONARY SVC/PX: CPT

## 2017-08-08 PROCEDURE — 36000680 HC C/S TUBAL LIGATION LEVEL I

## 2017-08-08 PROCEDURE — 37000009 HC ANESTHESIA EA ADD 15 MINS: Performed by: OBSTETRICS & GYNECOLOGY

## 2017-08-08 PROCEDURE — S0028 INJECTION, FAMOTIDINE, 20 MG: HCPCS | Performed by: STUDENT IN AN ORGANIZED HEALTH CARE EDUCATION/TRAINING PROGRAM

## 2017-08-08 PROCEDURE — 63600175 PHARM REV CODE 636 W HCPCS: Performed by: STUDENT IN AN ORGANIZED HEALTH CARE EDUCATION/TRAINING PROGRAM

## 2017-08-08 PROCEDURE — 63600175 PHARM REV CODE 636 W HCPCS: Performed by: OBSTETRICS & GYNECOLOGY

## 2017-08-08 PROCEDURE — 88302 TISSUE EXAM BY PATHOLOGIST: CPT | Performed by: PATHOLOGY

## 2017-08-08 PROCEDURE — 85025 COMPLETE CBC W/AUTO DIFF WBC: CPT

## 2017-08-08 PROCEDURE — 82800 BLOOD PH: CPT

## 2017-08-08 PROCEDURE — 86880 COOMBS TEST DIRECT: CPT

## 2017-08-08 PROCEDURE — 36416 COLLJ CAPILLARY BLOOD SPEC: CPT

## 2017-08-08 RX ORDER — ONDANSETRON HYDROCHLORIDE 2 MG/ML
INJECTION, SOLUTION INTRAMUSCULAR; INTRAVENOUS
Status: DISCONTINUED | OUTPATIENT
Start: 2017-08-08 | End: 2017-08-08

## 2017-08-08 RX ORDER — OXYCODONE AND ACETAMINOPHEN 10; 325 MG/1; MG/1
1 TABLET ORAL EVERY 4 HOURS PRN
Status: DISCONTINUED | OUTPATIENT
Start: 2017-08-08 | End: 2017-08-10 | Stop reason: HOSPADM

## 2017-08-08 RX ORDER — PHENYLEPHRINE HYDROCHLORIDE 10 MG/ML
INJECTION INTRAVENOUS
Status: DISCONTINUED | OUTPATIENT
Start: 2017-08-08 | End: 2017-08-08

## 2017-08-08 RX ORDER — FAMOTIDINE 10 MG/ML
20 INJECTION INTRAVENOUS ONCE
Status: COMPLETED | OUTPATIENT
Start: 2017-08-08 | End: 2017-08-08

## 2017-08-08 RX ORDER — MORPHINE SULFATE 1 MG/ML
INJECTION, SOLUTION EPIDURAL; INTRATHECAL; INTRAVENOUS
Status: DISCONTINUED | OUTPATIENT
Start: 2017-08-08 | End: 2017-08-08

## 2017-08-08 RX ORDER — OXYCODONE AND ACETAMINOPHEN 5; 325 MG/1; MG/1
1 TABLET ORAL EVERY 4 HOURS PRN
Status: DISCONTINUED | OUTPATIENT
Start: 2017-08-08 | End: 2017-08-10 | Stop reason: HOSPADM

## 2017-08-08 RX ORDER — DIPHENHYDRAMINE HYDROCHLORIDE 50 MG/ML
25 INJECTION INTRAMUSCULAR; INTRAVENOUS EVERY 4 HOURS PRN
Status: DISCONTINUED | OUTPATIENT
Start: 2017-08-08 | End: 2017-08-10 | Stop reason: HOSPADM

## 2017-08-08 RX ORDER — ONDANSETRON 2 MG/ML
4 INJECTION INTRAMUSCULAR; INTRAVENOUS ONCE
Status: DISCONTINUED | OUTPATIENT
Start: 2017-08-08 | End: 2017-08-10 | Stop reason: HOSPADM

## 2017-08-08 RX ORDER — SODIUM CHLORIDE, SODIUM LACTATE, POTASSIUM CHLORIDE, CALCIUM CHLORIDE 600; 310; 30; 20 MG/100ML; MG/100ML; MG/100ML; MG/100ML
INJECTION, SOLUTION INTRAVENOUS CONTINUOUS PRN
Status: DISCONTINUED | OUTPATIENT
Start: 2017-08-08 | End: 2017-08-08

## 2017-08-08 RX ORDER — SODIUM CHLORIDE, SODIUM LACTATE, POTASSIUM CHLORIDE, CALCIUM CHLORIDE 600; 310; 30; 20 MG/100ML; MG/100ML; MG/100ML; MG/100ML
INJECTION, SOLUTION INTRAVENOUS CONTINUOUS
Status: DISCONTINUED | OUTPATIENT
Start: 2017-08-08 | End: 2017-08-10 | Stop reason: HOSPADM

## 2017-08-08 RX ORDER — ONDANSETRON 8 MG/1
8 TABLET, ORALLY DISINTEGRATING ORAL EVERY 8 HOURS PRN
Status: DISCONTINUED | OUTPATIENT
Start: 2017-08-08 | End: 2017-08-10 | Stop reason: HOSPADM

## 2017-08-08 RX ORDER — MISOPROSTOL 200 UG/1
800 TABLET ORAL
Status: DISCONTINUED | OUTPATIENT
Start: 2017-08-08 | End: 2017-08-10 | Stop reason: HOSPADM

## 2017-08-08 RX ORDER — SODIUM CITRATE AND CITRIC ACID MONOHYDRATE 334; 500 MG/5ML; MG/5ML
30 SOLUTION ORAL
Status: DISCONTINUED | OUTPATIENT
Start: 2017-08-08 | End: 2017-08-10 | Stop reason: HOSPADM

## 2017-08-08 RX ORDER — METHYLERGONOVINE MALEATE 0.2 MG/ML
INJECTION INTRAVENOUS
Status: DISCONTINUED
Start: 2017-08-08 | End: 2017-08-08 | Stop reason: WASHOUT

## 2017-08-08 RX ORDER — ACETAMINOPHEN 500 MG
1000 TABLET ORAL EVERY 8 HOURS
Status: ACTIVE | OUTPATIENT
Start: 2017-08-08 | End: 2017-08-09

## 2017-08-08 RX ORDER — ADHESIVE BANDAGE
10 BANDAGE TOPICAL EVERY 6 HOURS PRN
Status: DISCONTINUED | OUTPATIENT
Start: 2017-08-09 | End: 2017-08-10 | Stop reason: HOSPADM

## 2017-08-08 RX ORDER — FENTANYL CITRATE 50 UG/ML
INJECTION, SOLUTION INTRAMUSCULAR; INTRAVENOUS
Status: DISCONTINUED | OUTPATIENT
Start: 2017-08-08 | End: 2017-08-08

## 2017-08-08 RX ORDER — METOCLOPRAMIDE HYDROCHLORIDE 5 MG/ML
5 INJECTION INTRAMUSCULAR; INTRAVENOUS EVERY 6 HOURS PRN
Status: DISCONTINUED | OUTPATIENT
Start: 2017-08-08 | End: 2017-08-10 | Stop reason: HOSPADM

## 2017-08-08 RX ORDER — OXYTOCIN/RINGER'S LACTATE 20/1000 ML
333 PLASTIC BAG, INJECTION (ML) INTRAVENOUS CONTINUOUS
Status: DISPENSED | OUTPATIENT
Start: 2017-08-08 | End: 2017-08-08

## 2017-08-08 RX ORDER — BISACODYL 10 MG
10 SUPPOSITORY, RECTAL RECTAL ONCE AS NEEDED
Status: ACTIVE | OUTPATIENT
Start: 2017-08-08 | End: 2017-08-08

## 2017-08-08 RX ORDER — OXYTOCIN 10 [USP'U]/ML
INJECTION, SOLUTION INTRAMUSCULAR; INTRAVENOUS
Status: DISCONTINUED | OUTPATIENT
Start: 2017-08-08 | End: 2017-08-08

## 2017-08-08 RX ORDER — NALBUPHINE HYDROCHLORIDE 20 MG/ML
2.5 INJECTION, SOLUTION INTRAMUSCULAR; INTRAVENOUS; SUBCUTANEOUS ONCE
Status: DISCONTINUED | OUTPATIENT
Start: 2017-08-08 | End: 2017-08-10 | Stop reason: HOSPADM

## 2017-08-08 RX ORDER — IBUPROFEN 400 MG/1
800 TABLET ORAL 3 TIMES DAILY
Status: DISCONTINUED | OUTPATIENT
Start: 2017-08-08 | End: 2017-08-10 | Stop reason: HOSPADM

## 2017-08-08 RX ORDER — CARBOPROST TROMETHAMINE 250 UG/ML
INJECTION, SOLUTION INTRAMUSCULAR
Status: DISCONTINUED
Start: 2017-08-08 | End: 2017-08-08 | Stop reason: WASHOUT

## 2017-08-08 RX ORDER — CLINDAMYCIN PHOSPHATE 900 MG/50ML
900 INJECTION, SOLUTION INTRAVENOUS ONCE
Status: COMPLETED | OUTPATIENT
Start: 2017-08-08 | End: 2017-08-08

## 2017-08-08 RX ORDER — KETOROLAC TROMETHAMINE 30 MG/ML
INJECTION, SOLUTION INTRAMUSCULAR; INTRAVENOUS
Status: DISCONTINUED | OUTPATIENT
Start: 2017-08-08 | End: 2017-08-08

## 2017-08-08 RX ORDER — HYDROCORTISONE 25 MG/G
CREAM TOPICAL 3 TIMES DAILY PRN
Status: DISCONTINUED | OUTPATIENT
Start: 2017-08-08 | End: 2017-08-10 | Stop reason: HOSPADM

## 2017-08-08 RX ORDER — MORPHINE SULFATE 2 MG/ML
2 INJECTION, SOLUTION INTRAMUSCULAR; INTRAVENOUS
Status: DISPENSED | OUTPATIENT
Start: 2017-08-08 | End: 2017-08-09

## 2017-08-08 RX ORDER — FAMOTIDINE 10 MG/ML
20 INJECTION INTRAVENOUS
Status: DISCONTINUED | OUTPATIENT
Start: 2017-08-08 | End: 2017-08-10 | Stop reason: HOSPADM

## 2017-08-08 RX ORDER — BUPIVACAINE HYDROCHLORIDE 2.5 MG/ML
30 INJECTION, SOLUTION EPIDURAL; INFILTRATION; INTRACAUDAL ONCE
Status: DISCONTINUED | OUTPATIENT
Start: 2017-08-08 | End: 2017-08-10 | Stop reason: HOSPADM

## 2017-08-08 RX ORDER — CEFAZOLIN SODIUM 2 G/50ML
2 SOLUTION INTRAVENOUS
Status: DISCONTINUED | OUTPATIENT
Start: 2017-08-08 | End: 2017-08-10 | Stop reason: HOSPADM

## 2017-08-08 RX ORDER — BUPIVACAINE HYDROCHLORIDE 7.5 MG/ML
INJECTION, SOLUTION INTRASPINAL
Status: DISCONTINUED | OUTPATIENT
Start: 2017-08-08 | End: 2017-08-08

## 2017-08-08 RX ORDER — OXYCODONE HYDROCHLORIDE 5 MG/1
5 TABLET ORAL EVERY 4 HOURS PRN
Status: DISCONTINUED | OUTPATIENT
Start: 2017-08-08 | End: 2017-08-10 | Stop reason: HOSPADM

## 2017-08-08 RX ORDER — OXYTOCIN/RINGER'S LACTATE 20/1000 ML
41.65 PLASTIC BAG, INJECTION (ML) INTRAVENOUS CONTINUOUS
Status: ACTIVE | OUTPATIENT
Start: 2017-08-08 | End: 2017-08-08

## 2017-08-08 RX ORDER — SIMETHICONE 80 MG
1 TABLET,CHEWABLE ORAL EVERY 6 HOURS PRN
Status: DISCONTINUED | OUTPATIENT
Start: 2017-08-08 | End: 2017-08-10 | Stop reason: HOSPADM

## 2017-08-08 RX ORDER — DIPHENHYDRAMINE HCL 25 MG
25 CAPSULE ORAL EVERY 4 HOURS PRN
Status: DISCONTINUED | OUTPATIENT
Start: 2017-08-08 | End: 2017-08-10 | Stop reason: HOSPADM

## 2017-08-08 RX ORDER — SODIUM CITRATE AND CITRIC ACID MONOHYDRATE 334; 500 MG/5ML; MG/5ML
30 SOLUTION ORAL ONCE AS NEEDED
Status: DISPENSED | OUTPATIENT
Start: 2017-08-08 | End: 2017-08-08

## 2017-08-08 RX ORDER — AMOXICILLIN 250 MG
1 CAPSULE ORAL NIGHTLY PRN
Status: DISCONTINUED | OUTPATIENT
Start: 2017-08-08 | End: 2017-08-10 | Stop reason: HOSPADM

## 2017-08-08 RX ORDER — DIPHENHYDRAMINE HYDROCHLORIDE 50 MG/ML
12.5 INJECTION INTRAMUSCULAR; INTRAVENOUS NIGHTLY PRN
Status: DISCONTINUED | OUTPATIENT
Start: 2017-08-08 | End: 2017-08-10 | Stop reason: HOSPADM

## 2017-08-08 RX ORDER — METOCLOPRAMIDE HYDROCHLORIDE 5 MG/ML
10 INJECTION INTRAMUSCULAR; INTRAVENOUS ONCE
Status: COMPLETED | OUTPATIENT
Start: 2017-08-08 | End: 2017-08-08

## 2017-08-08 RX ORDER — DIPHENHYDRAMINE HYDROCHLORIDE 50 MG/ML
12.5 INJECTION INTRAMUSCULAR; INTRAVENOUS EVERY 4 HOURS PRN
Status: DISCONTINUED | OUTPATIENT
Start: 2017-08-08 | End: 2017-08-10 | Stop reason: HOSPADM

## 2017-08-08 RX ADMIN — ONDANSETRON 4 MG: 2 INJECTION, SOLUTION INTRAMUSCULAR; INTRAVENOUS at 08:08

## 2017-08-08 RX ADMIN — PHENYLEPHRINE HYDROCHLORIDE 200 MCG: 10 INJECTION INTRAVENOUS at 08:08

## 2017-08-08 RX ADMIN — BUPIVACAINE HYDROCHLORIDE 1.6 ML: 7.5 INJECTION, SOLUTION SUBARACHNOID at 08:08

## 2017-08-08 RX ADMIN — Medication 41.65 MILLI-UNITS/MIN: at 11:08

## 2017-08-08 RX ADMIN — KETOROLAC TROMETHAMINE 60 MG: 30 INJECTION, SOLUTION INTRAMUSCULAR; INTRAVENOUS at 09:08

## 2017-08-08 RX ADMIN — FAMOTIDINE 20 MG: 10 INJECTION INTRAVENOUS at 07:08

## 2017-08-08 RX ADMIN — SODIUM CHLORIDE, SODIUM LACTATE, POTASSIUM CHLORIDE, AND CALCIUM CHLORIDE: .6; .31; .03; .02 INJECTION, SOLUTION INTRAVENOUS at 08:08

## 2017-08-08 RX ADMIN — GENTAMICIN SULFATE 128 MG: 40 INJECTION, SOLUTION INTRAMUSCULAR; INTRAVENOUS at 08:08

## 2017-08-08 RX ADMIN — FENTANYL CITRATE 10 MCG: 50 INJECTION, SOLUTION INTRAMUSCULAR; INTRAVENOUS at 08:08

## 2017-08-08 RX ADMIN — IBUPROFEN 800 MG: 400 TABLET, FILM COATED ORAL at 03:08

## 2017-08-08 RX ADMIN — MORPHINE SULFATE 2 MG: 2 INJECTION, SOLUTION INTRAMUSCULAR; INTRAVENOUS at 10:08

## 2017-08-08 RX ADMIN — OXYCODONE HYDROCHLORIDE AND ACETAMINOPHEN 1 TABLET: 10; 325 TABLET ORAL at 08:08

## 2017-08-08 RX ADMIN — MORPHINE SULFATE 2 MG: 2 INJECTION, SOLUTION INTRAMUSCULAR; INTRAVENOUS at 11:08

## 2017-08-08 RX ADMIN — SIMETHICONE CHEW TAB 80 MG 80 MG: 80 TABLET ORAL at 05:08

## 2017-08-08 RX ADMIN — SODIUM CHLORIDE, SODIUM LACTATE, POTASSIUM CHLORIDE, AND CALCIUM CHLORIDE: .6; .31; .03; .02 INJECTION, SOLUTION INTRAVENOUS at 07:08

## 2017-08-08 RX ADMIN — MORPHINE SULFATE 2 MG: 2 INJECTION, SOLUTION INTRAMUSCULAR; INTRAVENOUS at 07:08

## 2017-08-08 RX ADMIN — CLINDAMYCIN IN 5 PERCENT DEXTROSE 900 MG: 18 INJECTION, SOLUTION INTRAVENOUS at 07:08

## 2017-08-08 RX ADMIN — MORPHINE SULFATE 0.2 MG: 1 INJECTION, SOLUTION EPIDURAL; INTRATHECAL; INTRAVENOUS at 08:08

## 2017-08-08 RX ADMIN — METOCLOPRAMIDE 10 MG: 5 INJECTION, SOLUTION INTRAMUSCULAR; INTRAVENOUS at 07:08

## 2017-08-08 RX ADMIN — PHENYLEPHRINE HYDROCHLORIDE 100 MCG: 10 INJECTION INTRAVENOUS at 08:08

## 2017-08-08 RX ADMIN — OXYTOCIN 30 UNITS: 10 INJECTION, SOLUTION INTRAMUSCULAR; INTRAVENOUS at 08:08

## 2017-08-08 NOTE — OP NOTE
DATE OF PROCEDURE:  2017    PREOPERATIVE DIAGNOSES:  1.  Intrauterine pregnancy at 39 and 1/7th weeks' gestation.  2.  Previous  section x2.  3.  Desires BTL.  4.  KAYLIN.  5.  Rh negative.    POSTOPERATIVE DIAGNOSES:  1.  Intrauterine pregnancy at 39 and 1/7th weeks' gestation.  2.  Previous  section x2.  3.  Desires BTL.  4.  KAYLIN.  5.  Rh negative.  6.  Multiple uterine fibroids.    PROCEDURE:  Repeat low transverse  section via Pfannenstiel incision   with bilateral tubal ligation via modified Eileen method.    SURGEON:  Jose Elias Wei M.D.    ASSISTANT:  Ms. Selma Fan.    ANESTHESIA:  Spinal.    COMPLICATIONS:  None.    ESTIMATED BLOOD LOSS:  Approximately 800 mL.    PROCEDURE IN DETAIL:  After assuring informed consent, the patient was   transferred to the Operating Room where she underwent spinal anesthesia without   difficulty.  Abdomen and perineum were prepped and draped in normal sterile   fashion in dorsal supine position.  Simmons catheter was inserted under sterile   conditions.  Following draping, assessment of anesthesia noted to be adequate   and a Pfannenstiel skin incision was made with a scalpel.  The prior    scar was excised.  Incision was taken down the fascia with Bovie.  Fascia was   incised in the midline and extended laterally with Bovie cautery.  Underlying   rectus muscles were dissected off and  in the midline.  Peritoneum was   identified, tented upward and entered with Bovie cautery.  Peritoneal incision   was extended superiorly and inferiorly with good visualization of bladder.    Bladder blade was inserted and transverse uterine incision was made with   scalpel.  Uterine incision was finger fractured in a cephalocaudad manner.    Amniotomy was performed with a hemostat and the infant's head was delivered   atraumatically.  Nose and mouth were suctioned with suction bulb.  Nuchal cord   was reduced and the rest of the infant was delivered  without difficulty and   handed off to waiting pediatric staff in vigorous condition.  Cord gases were   sent.  Placenta was manually extracted.  Uterus was exteriorized, wrapped in   moist laparotomy sponge.  Quintana's were placed on the left angle of the   uterine incision, was extended into the left broad ligament.  Once Quintana's   were placed, hemostasis was confirmed adequate.  The uterine incision was then   repaired starting at that left side and 2 figure-of-eight sutures on the top and   bottom of the incision to obtain good hemostasis and the sutures were then tied   together.  Then, a single running locked 0 Vicryl suture was then run across   the uterine incision to the opposite side and tied in place.  Excellent   hemostasis was noted.  Uterine incision was generously irrigated.  Attention was   then directed to the bilateral fallopian tubes, in which Patsy clamp was used   to grasp the tube in the midsection.  A 2-0 chromic suture was passed through   the mesosalpinx and a knuckle of fallopian tube was then suture ligated and   transected with Metzenbaum scissors on each side.  Specimens were handed off for   permanent section.  Excellent hemostasis was noted at both excision sites of   the tubes and uterus was returned to the abdominal cavity.  Gutters were cleared   of all clots and debris.  The uterine incision was visualized in situ,   generously irrigated and noted to be hemostatic.  A sheet of Interceed was   placed across the uterine incision and the rectus and peritoneum were closed   together with a 2-0 running Vicryl stitch.  Rectus was irrigated and noted to be   hemostatic and the fascia was closed with a #1 running Vicryl suture and   injected with 0.25% Marcaine.  After generous irrigation, subcutaneous tissues   were reapproximated with 2-0 Vicryl and skin was closed with staples.  Bandage   was applied.  The patient was transferred to Recovery Room in stable condition.     Pathological specimen includes bilateral segments of fallopian tube, single   viable male infant, 9 and 9 Apgars, and 3515 g.      DAMIEN  dd: 08/08/2017 09:15:55 (CDT)  td: 08/08/2017 10:59:35 (CDT)  Doc ID   #[id number]  Job ID #[id number]    CC:

## 2017-08-08 NOTE — LACTATION NOTE
08/08/17 1720   Maternal Infant Assessment   Breast Size Issue none   Breast Density Bilateral:;soft   Nipple Symptoms bilateral:;other (see comments)  (denies nipple tenderness/redness)   Infant Assessment   Mouth Size average   Sucking Reflex present   Rooting Reflex present   Swallow Reflex present   LATCH Score   Latch 2-->grasps breast, tongue down, lips flanged, rhythmic sucking   Audible Swallowing 2-->spontaneous and intermittent (24 hrs old)   Type Of Nipple 2-->everted (after stimulation)   Comfort (Breast/Nipple) 2-->soft/nontender   Hold (Positioning) 1-->minimal assist, teach one side: mother does other, staff holds   Score (less than 7 for 2/more consecutive times, consult Lactation Consultant) 9   Pain/Comfort Assessments   Pain Assessment Performed Yes       Number Scale   Presence of Pain denies  (denies pain to nipples while BF)   Location - Side Bilateral   Location nipple(s)   Pain Rating: Rest 0   Pain Rating: Activity 0   Maternal Infant Feeding   Maternal Preparation breast care;hand hygiene   Maternal Emotional State relaxed   Infant Positioning cradle  (right cradle hold,deep latch w/ lips flanged)   Time Spent (min) 15-30 min   Latch Assistance no  (baby on and breastfeeding well)   Breastfeeding Education adequate infant intake;adequate milk volume;importance of skin-to-skin contact;increasing milk supply;other (see comments)  (s/d,s2s,fdg.freq/cayetano,I&O,nipp care,etc.)   Breastfeeding History   Breastfeeding History yes   Previous Breastfeeding Success successful   Duration of Previous Breastfeeding 6 weeks   Feeding Infant   Feeding Readiness Cues sustained alertness;rooting;eager   Feeding Tolerance/Success sustained alertness;strong suck;eager;alert for feeding;coordinated suck;coordinated swallow   Effective Latch During Feeding yes   Audible Swallow yes   Suck/Swallow Coordination present   Skin-to-Skin Contact During Feeding no  (enc. mom to start feedings skin to skin)    Lactation Referrals   Lactation Consult Initial assessment;Knowledge deficit   Lactation Interventions   Attachment Promotion skin-to-skin contact encouraged;rooming-in promoted;role responsibility promoted;privacy provided;infant-mother separation minimized;family involvement promoted;face-to-face positioning promoted;environment adjusted   Breastfeeding Assistance support offered;feeding on demand promoted;feeding cue recognition promoted;feeding session observed;infant latch-on verified;infant suck/swallow verified   Maternal Breastfeeding Support diary/feeding log utilized;encouragement offered;infant-mother separation minimized;lactation counseling provided

## 2017-08-08 NOTE — PLAN OF CARE
Problem: Patient Care Overview  Goal: Plan of Care Review  Patient BBS clear and equal.  Good effort with Incentive Spirometry.  Encouraged patient to use on her own every hour.  Will continue to monitor patient daily
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
## 2017-08-08 NOTE — BRIEF OP NOTE
Delivery Note  2017      Obstetrician:   Jose Elias Thornton MD    Assistant: N/A    Pre-Delivery Diagnosis: Term pregnancy or Pregnancy complicated by: prior c/s, desires BTL, AMA, Rh -    Post-Delivery Diagnosis: Living  infant(s) or Male, uterine fibroids    Procedure: Repeat  section and BTL    Surgeon: Jose Elias Thornton     Assistants: Selma valdes    Anesthesia: Spinal anesthesia    ASA Class: none       Episiotomy or Incision: Pfannensteil    Indications for instrumental delivery: none    Infant Wt: 3515gm.  /   7lbs,  11oz.          Apgars: 1' 9  /  5' 9     Placenta and Cord:          Mechanism: spontaneous        Description:  complete    Estimated Blood Loss: 800           Placenta sent to path:  no           Complications:  none           Condition: stable      Attending Attestation: I was present and scrubbed for the entire procedure.

## 2017-08-08 NOTE — PLAN OF CARE
1145 - received report from DESI Vigil.    1200 - vss, nad, c/o abdominal pain, pt medicated w/morphine prior to transfer, pit #2 infusing, advanced to clear diet, scds/teds on.  Poc: bedrest, po hydration encouraged, pt oriented to room and reviewed POC w/pt.  Pt verbalized understanding.

## 2017-08-08 NOTE — ANESTHESIA PREPROCEDURE EVALUATION
2017  Evie Bishop is a 36 y.o., female  for repeat CS    Anesthesia Evaluation    I have reviewed the Patient Summary Reports.     I have reviewed the Medications.     Review of Systems  Anesthesia Hx:  No problems with previous Anesthesia Denies Hx of Anesthetic complications  History of prior surgery of interest to airway management or planning: Previous anesthesia: Spinal, Epidural, General Denies Family Hx of Anesthesia complications.   Denies Personal Hx of Anesthesia complications.   Social:  Non-Smoker, No Alcohol Use    Hematology/Oncology:         -- Anemia:   Cardiovascular:   Dysrhythmias ECG has been reviewed. History of SVT/episodes of sinus tachycardia    Echo 2015:  1 - Normal left ventricular systolic function (EF 60-65%).     2 - Normal left ventricular diastolic function.     3 - Normal right ventricular systolic function .     4 - The estimated PA systolic pressure is 23 mmHg.    Pulmonary:  Pulmonary Normal    Renal/:  Renal/ Normal     Hepatic/GI:  Hepatic/GI Normal    Neurological:  Neurology Normal    Endocrine:  Endocrine Normal    Psych:   anxiety          Physical Exam  General:  Well nourished    Airway/Jaw/Neck:  Airway Findings: Mouth Opening: Normal General Airway Assessment: Adult  Mallampati: II  Jaw/Neck Findings:  Neck ROM: Normal ROM      Dental:  Dental Findings: In tact   Chest/Lungs:  Chest/Lungs Findings: Clear to auscultation, Normal Respiratory Rate     Heart/Vascular:  Heart Findings: Rate: Normal  Rhythm: Regular Rhythm        Mental Status:  Mental Status Findings:  Cooperative, Alert and Oriented       Lab Results   Component Value Date    WBC 7.26 2017    HGB 9.4 (L) 2017    HCT 31.9 (L) 2017     2017    ALT 24 2017    AST 17 2017     2017    K 4.0 2017     2017    CREATININE  0.59 06/21/2017    BUN 9 06/21/2017    CO2 22 (L) 06/21/2017    INR 1.0 06/21/2017         Anesthesia Plan  Type of Anesthesia, risks & benefits discussed:  Anesthesia Type:  CSE, epidural, general, spinal, regional  Patient's Preference:   Intra-op Monitoring Plan: standard ASA monitors  Intra-op Monitoring Plan Comments:   Post Op Pain Control Plan: multimodal analgesia and per primary service following discharge from PACU  Post Op Pain Control Plan Comments:   Induction:    Beta Blocker:  Patient is not currently on a Beta-Blocker (No further documentation required).       Informed Consent: Patient understands risks and agrees with Anesthesia plan.  Questions answered. Anesthesia consent signed with patient.  ASA Score: 2     Day of Surgery Review of History & Physical:    H&P update referred to the surgeon.         Ready For Surgery From Anesthesia Perspective.

## 2017-08-08 NOTE — TRANSFER OF CARE
"Anesthesia Transfer of Care Note    Patient: Evie Bishop    Procedure(s) Performed: Procedure(s) (LRB):  DELIVERY- SECTION WITH TUBAL (N/A)    Patient location: Labor and Delivery    Anesthesia Type: spinal    Transport from OR: Transported from OR on room air with adequate spontaneous ventilation    Post pain: adequate analgesia    Post assessment: no apparent anesthetic complications and tolerated procedure well    Post vital signs: stable    Level of consciousness: awake, alert and oriented    Nausea/Vomiting: no nausea/vomiting    Complications: none    Transfer of care protocol was followed      Last vitals:   Visit Vitals  /61 (BP Location: Right arm, Patient Position: Lying, BP Method: Automatic)   Pulse 84   Temp 36.8 °C (98.2 °F) (Oral)   Resp 14   Ht 5' 3" (1.6 m)   Wt 81.6 kg (180 lb)   LMP 2016 (Exact Date)   SpO2 100%   BMI 31.89 kg/m²     "

## 2017-08-08 NOTE — ANESTHESIA PROCEDURE NOTES
Spinal    Diagnosis: IUP   Patient location during procedure: OR  Staffing  Anesthesiologist: ROXIE GORDON  Resident/CRNA: BRYANT TRISTAN  Performed: resident/CRNA and anesthesiologist   Preanesthetic Checklist  Completed: patient identified, surgical consent, pre-op evaluation, timeout performed, IV checked, risks and benefits discussed and monitors and equipment checked  Spinal Block  Patient position: sitting  Prep: ChloraPrep  Patient monitoring: heart rate, cardiac monitor and continuous pulse ox  Approach: midline  Location: L3-4  Injection technique: single shot  CSF Fluid: clear free-flowing CSF  Needle  Needle type: Roberta   Needle gauge: 25 G  Needle length: 3.5 in  Additional Documentation: negative aspiration for heme and no paresthesia on injection  Needle localization: anatomical landmarks  Assessment  Sensory level: T4   Dermatomal levels determined by pinch or prick  Ease of block: easy  Patient's tolerance of the procedure: comfortable throughout block and no complaints  Medications:  Bolus administered: 1.6 mL of 0.75 and with dextrose bupivacaine  Opioid administered: 10 mcg of   fentanyl  Additional Notes  duramorph 200mcg added.

## 2017-08-09 LAB
ABO + RH BLD: NORMAL
ANISOCYTOSIS BLD QL SMEAR: SLIGHT
BASOPHILS # BLD AUTO: 0.01 K/UL
BASOPHILS NFR BLD: 0.1 %
BLD GP AB SCN CELLS X3 SERPL QL: NORMAL
DIFFERENTIAL METHOD: ABNORMAL
EOSINOPHIL # BLD AUTO: 0.1 K/UL
EOSINOPHIL NFR BLD: 1.2 %
ERYTHROCYTE [DISTWIDTH] IN BLOOD BY AUTOMATED COUNT: 17.4 %
FETAL CELL SCN BLD QL ROSETTE: NORMAL
HCT VFR BLD AUTO: 24.6 %
HGB BLD-MCNC: 7.3 G/DL
INJECT RH IG VOL PATIENT: NORMAL ML
LYMPHOCYTES # BLD AUTO: 0.9 K/UL
LYMPHOCYTES NFR BLD: 11 %
MCH RBC QN AUTO: 20.3 PG
MCHC RBC AUTO-ENTMCNC: 29.7 G/DL
MCV RBC AUTO: 68 FL
MONOCYTES # BLD AUTO: 0.5 K/UL
MONOCYTES NFR BLD: 5.9 %
NEUTROPHILS # BLD AUTO: 6.5 K/UL
NEUTROPHILS NFR BLD: 81.8 %
OVALOCYTES BLD QL SMEAR: ABNORMAL
PLATELET # BLD AUTO: 148 K/UL
PLATELET BLD QL SMEAR: ABNORMAL
PMV BLD AUTO: 10.4 FL
POLYCHROMASIA BLD QL SMEAR: ABNORMAL
RBC # BLD AUTO: 3.6 M/UL
RPR SER QL: NORMAL
WBC # BLD AUTO: 8.01 K/UL

## 2017-08-09 PROCEDURE — 25000003 PHARM REV CODE 250: Performed by: OBSTETRICS & GYNECOLOGY

## 2017-08-09 PROCEDURE — 85025 COMPLETE CBC W/AUTO DIFF WBC: CPT

## 2017-08-09 PROCEDURE — 85461 HEMOGLOBIN FETAL: CPT

## 2017-08-09 PROCEDURE — 63600519 RHOGAM PHARM REV CODE 636 ALT 250 W HCPCS: Performed by: OBSTETRICS & GYNECOLOGY

## 2017-08-09 PROCEDURE — 86901 BLOOD TYPING SEROLOGIC RH(D): CPT

## 2017-08-09 PROCEDURE — 36415 COLL VENOUS BLD VENIPUNCTURE: CPT

## 2017-08-09 PROCEDURE — 63600175 PHARM REV CODE 636 W HCPCS: Performed by: OBSTETRICS & GYNECOLOGY

## 2017-08-09 PROCEDURE — 11000001 HC ACUTE MED/SURG PRIVATE ROOM

## 2017-08-09 PROCEDURE — 86900 BLOOD TYPING SEROLOGIC ABO: CPT

## 2017-08-09 RX ORDER — HYDROMORPHONE HYDROCHLORIDE 2 MG/ML
0.5 INJECTION, SOLUTION INTRAMUSCULAR; INTRAVENOUS; SUBCUTANEOUS ONCE
Status: DISCONTINUED | OUTPATIENT
Start: 2017-08-09 | End: 2017-08-10 | Stop reason: HOSPADM

## 2017-08-09 RX ORDER — FERROUS SULFATE 325(65) MG
325 TABLET, DELAYED RELEASE (ENTERIC COATED) ORAL 2 TIMES DAILY
Status: DISCONTINUED | OUTPATIENT
Start: 2017-08-09 | End: 2017-08-10 | Stop reason: HOSPADM

## 2017-08-09 RX ADMIN — OXYCODONE HYDROCHLORIDE AND ACETAMINOPHEN 1 TABLET: 10; 325 TABLET ORAL at 05:08

## 2017-08-09 RX ADMIN — IBUPROFEN 800 MG: 400 TABLET, FILM COATED ORAL at 12:08

## 2017-08-09 RX ADMIN — IBUPROFEN 800 MG: 400 TABLET, FILM COATED ORAL at 09:08

## 2017-08-09 RX ADMIN — FERROUS SULFATE TAB EC 325 MG (65 MG FE EQUIVALENT) 325 MG: 325 (65 FE) TABLET DELAYED RESPONSE at 09:08

## 2017-08-09 RX ADMIN — OXYCODONE HYDROCHLORIDE AND ACETAMINOPHEN 1 TABLET: 10; 325 TABLET ORAL at 04:08

## 2017-08-09 RX ADMIN — OXYCODONE HYDROCHLORIDE AND ACETAMINOPHEN 1 TABLET: 10; 325 TABLET ORAL at 01:08

## 2017-08-09 RX ADMIN — IBUPROFEN 800 MG: 400 TABLET, FILM COATED ORAL at 01:08

## 2017-08-09 RX ADMIN — IBUPROFEN 800 MG: 400 TABLET, FILM COATED ORAL at 05:08

## 2017-08-09 RX ADMIN — OXYCODONE HYDROCHLORIDE AND ACETAMINOPHEN 1 TABLET: 10; 325 TABLET ORAL at 09:08

## 2017-08-09 RX ADMIN — OXYCODONE HYDROCHLORIDE AND ACETAMINOPHEN 1 TABLET: 10; 325 TABLET ORAL at 12:08

## 2017-08-09 RX ADMIN — HUMAN RHO(D) IMMUNE GLOBULIN 300 MCG: 300 INJECTION, SOLUTION INTRAMUSCULAR at 05:08

## 2017-08-09 RX ADMIN — OXYCODONE HYDROCHLORIDE AND ACETAMINOPHEN 1 TABLET: 10; 325 TABLET ORAL at 08:08

## 2017-08-09 NOTE — PROGRESS NOTES
Progress Note  OB/GYN  Admit Date: 8/8/2017   LOS: 1 day   SUBJECTIVE:     Patient seen and examined this AM. KARLIEON. Patient states she is still in pain. Is controlled with current pain mgmt. Able to tolerate a diet but still feels weak.    Continuous Infusions:   fentaNYL 2 mcg/mL / ROPIvacaine 0.2% in 200mL 0.9% Sodium Chloride 200mL premixed Bag      lactated Ringers      lactated Ringers      lanolin       Scheduled Meds:   acetaminophen  1,000 mg Oral Q8H    bupivacaine (PF) 0.25% (2.5 mg/ml)  30 mL Subcutaneous Once    ibuprofen  800 mg Oral TID    lactated ringers  1,000 mL Intravenous Once    nalbuphine  2.6 mg Intravenous Once    ondansetron  4 mg Intravenous Once     PRN Meds:benzocaine-lanolin-aloe vera, ceFAZolin (ANCEF) IVPB, citric acid-sodium citrate 500-334 mg/5 ml, diphenhydrAMINE, diphenhydrAMINE, diphenhydrAMINE, diphenhydrAMINE, famotidine (PF), hydrocortisone, lactated ringers, lanolin, magnesium hydroxide 400 mg/5 ml, measles, mumps and rubella vaccine, metoclopramide HCl, misoprostol, morphine, ondansetron, oxycodone, oxycodone-acetaminophen, oxycodone-acetaminophen, promethazine (PHENERGAN) IVPB, promethazine (PHENERGAN) IVPB, rho(D) immune globulin, senna-docusate 8.6-50 mg, simethicone, DIPH,PERTUS (ADACEL),TETANUS PF VAC (ADULT)    Allergies:   Review of patient's allergies indicates:   Allergen Reactions    Strawberries [strawberry] Anaphylaxis    Penicillins Rash       ROS  As per subjective  OBJECTIVE:   Vital Signs (Most Recent)  Temp: 98.3 °F (36.8 °C) (08/09/17 0400)  Pulse: 99 (08/09/17 0400)  Resp: 18 (08/09/17 0400)  BP: 124/61 (08/09/17 0400)  SpO2: 99 % (08/08/17 1600)  Vital Signs Range (Last 24H):  Temp:  [97.4 °F (36.3 °C)-98.3 °F (36.8 °C)]   Pulse:  [70-99]   Resp:  [14-18]   BP: (124-141)/(56-79)   SpO2:  [99 %-100 %]     I & O (Last 24H):  Intake/Output Summary (Last 24 hours) at 08/09/17 0732  Last data filed at 08/09/17 0600   Gross per 24 hour   Intake               200 ml   Output             4535 ml   Net            -4335 ml     Wt Readings from Last 3 Encounters:   17 81.6 kg (180 lb)   17 81.7 kg (180 lb 3.2 oz)   17 81.6 kg (179 lb 14.3 oz)          Current Diet Order   Procedures    Diet Adult Regular        Lines/Drains:       Peripheral IV - Single Lumen 17 0651 Left Wrist (Active)   Site Assessment Clean;Dry;Intact;No redness;No swelling 2017  7:50 PM   Line Status Infusing 2017  7:50 PM   Dressing Status Clean;Dry;Intact 2017  7:50 PM   Number of days: 1       Physical Exam   Constitutional: She is oriented to person, place, and time.   HENT:   Head: Normocephalic and atraumatic.   Neck: Neck supple. No JVD present.   Cardiovascular: Normal rate, regular rhythm, normal heart sounds and intact distal pulses.  Exam reveals no gallop and no friction rub.    No murmur heard.  Pulmonary/Chest: Effort normal and breath sounds normal. No respiratory distress. She has no wheezes.   Abdominal: Soft. Bowel sounds are normal. There is tenderness (around bandaged area).   Incision is bandaged   Musculoskeletal: She exhibits no edema.   Neurological: She is alert and oriented to person, place, and time.     Laboratory Data:  CBC    Recent Labs  Lab 17  0620 17  0448   WBC 7.26 8.01   RBC 4.70 3.60*   HGB 9.4* 7.3*   HCT 31.9* 24.6*    148*   MCV 68* 68*   MCH 20.0* 20.3*   MCHC 29.5* 29.7*         ASSESSMENT/PLAN:   Evie Bishop is a 36 y.o. female  POD#1 s/p C/S w/ TBL.    Plan:  - continue pain mgmt  - rafaela was valarie in AM. F/u UOP.  - H/H is 7.3/24.6. Would benefit from iron supplementation   - check CBC tomorrow AM  - continue progressive care      Damir Cunningham MD  [facility] [employer]   2017 7:32 AM

## 2017-08-09 NOTE — PLAN OF CARE
Problem: Patient Care Overview  Goal: Plan of Care Review  Outcome: Ongoing (interventions implemented as appropriate)  Pt tolerating regular diet, russo due to be discontinued at 0600,  vss, nad.  Pain managed well with ordered medication.

## 2017-08-09 NOTE — LACTATION NOTE
08/09/17 1200   Maternal Infant Assessment   Breast Density filling;soft  (per pt)   Infant Assessment   Weight Loss (%) 3   Sucking Reflex present  (per pt)   Rooting Reflex present   Swallow Reflex present   Maternal Infant Feeding   Maternal Emotional State independent;relaxed   Signs of Milk Transfer audible swallow  (per pt)   Presence of Pain no   Time Spent (min) 15-30 min   Latch Assistance no   Breastfeeding Education increasing milk supply;returning to work   Lactation Referrals   Lactation Consult Breastfeeding assessment   Lactation Interventions   Attachment Promotion family involvement promoted;counseling provided   Breastfeeding Assistance feeding cue recognition promoted;feeding on demand promoted   Maternal Breastfeeding Support lactation counseling provided;infant-mother separation minimized;encouragement offered

## 2017-08-09 NOTE — PLAN OF CARE
"0700 - assumed care of pt    0830 - vss, nad, pain well controlled w/po pain meds, tolerating regular diet, passing gas, appears to be bonding well w/infant.  POC: pain management, po hydration encouraged, ambulation encouraged, continue to monitor.  Reviewed POC w/pt.  Pt verbalized understanding.    1615 - pt c/o 7/10 "burning" pain on left side and reports some relief with percocet.  Notified MD Carpenter.  Dilaudid 0.5mg IV x1 ordered.  "

## 2017-08-09 NOTE — PLAN OF CARE
0600 Simmons cath removed, tip intact.  Pericare done, pt tolerated well.  Pt instructed to call for assistance prior to ambulating.  Pt verbalized understanding.

## 2017-08-09 NOTE — PHYSICIAN QUERY
"PT Name: Evie Bishop  MR #: [id number]    Physician Query Form - Hematology Clarification      CDS/: Miranda Walker RN-BSN       Contact information:[phone]    This form is a permanent document in the medical record.      Query Date: 2017    By submitting this query, we are merely seeking further clarification of documentation. Please utilize your independent clinical judgment when addressing the question(s) below.    The Medical record contains the following:   Indicators  Supporting Clinical Findings Location in Medical Record   X "Anemia" documented anemia of pregnancy worsened after delivery  OB Progress note @846am   X H & H = Hgb=7.3-9.4  Hct=24.6-31.9 LAB -    BP =                     HR=      "GI bleeding" documented     X Acute bleeding (Non GI site) ESTIMATED BLOOD LOSS:  Approximately 800 mL. Op note     Transfusion(s)     X Treatment: Discussed possible need for transfusion if symptomatic. Pt desires to hold on transfusion. Start iron/colace and continue to closely monitor. Aided ambulation only for now and given precautions.  OB Progress note @846am   X Other:  PROCEDURE:  Repeat low transverse  section via Pfannenstiel incision   with bilateral tubal ligation via modified Eileen method. Op note      Provider, please specify diagnosis or diagnoses associated with above clinical findings.    [ x ] Acute blood loss anemia expected post-operatively  [  ] Acute blood loss anemia  [  ] Iron deficiency anemia  [  ] Other Hematological Diagnosis (please specify): _________________________________  [  ] Clinically Undetermined       Please document in your progress notes daily for the duration of treatment, until resolved, and include in your discharge summary.                                                                                                      "

## 2017-08-09 NOTE — ANESTHESIA POSTPROCEDURE EVALUATION
"Anesthesia Post Evaluation    Patient: Evie Bishop    Procedure(s) Performed: Procedure(s) (LRB):  DELIVERY- SECTION WITH TUBAL (N/A)    Final Anesthesia Type: spinal  Patient location during evaluation: labor & delivery  Patient participation: Yes- Able to Participate  Level of consciousness: awake and alert and oriented  Post-procedure vital signs: reviewed and stable  Pain management: adequate  Airway patency: patent  PONV status at discharge: No PONV  Anesthetic complications: no      Cardiovascular status: blood pressure returned to baseline  Respiratory status: unassisted and room air  Hydration status: euvolemic  Follow-up not needed.  Comments: No catheter in back  No headache/neckache/backache  Full return of neurological function  Able to urinate  Advised patient to report any new problems of back pain, especially with fever or decreasing bladder function occurring during coming days to weeks          Visit Vitals  /61 (BP Location: Right arm, Patient Position: Lying, BP Method: Automatic)   Pulse 99   Temp 36.8 °C (98.3 °F) (Oral)   Resp 18   Ht 5' 3" (1.6 m)   Wt 81.6 kg (180 lb)   LMP 2016 (Exact Date)   SpO2 99%   Breastfeeding? Yes   BMI 31.89 kg/m²       Pain/Tate Score: Pain Rating Prior to Med Admin: 5 (2017  8:35 AM)  Pain Rating Post Med Admin: 0 (2017  5:30 AM)    No catheter in back  No headache/neckache/backache  Full return of neurological function  Able to urinate  Advised patient to report any new problems of back pain, especially with fever or decreasing bladder function occurring during coming days to weeks      "

## 2017-08-10 VITALS
WEIGHT: 180 LBS | HEART RATE: 87 BPM | BODY MASS INDEX: 31.89 KG/M2 | SYSTOLIC BLOOD PRESSURE: 132 MMHG | RESPIRATION RATE: 18 BRPM | TEMPERATURE: 98 F | OXYGEN SATURATION: 99 % | HEIGHT: 63 IN | DIASTOLIC BLOOD PRESSURE: 83 MMHG

## 2017-08-10 PROCEDURE — 99900035 HC TECH TIME PER 15 MIN (STAT)

## 2017-08-10 PROCEDURE — 25000003 PHARM REV CODE 250: Performed by: OBSTETRICS & GYNECOLOGY

## 2017-08-10 PROCEDURE — 63600175 PHARM REV CODE 636 W HCPCS: Performed by: OBSTETRICS & GYNECOLOGY

## 2017-08-10 PROCEDURE — 94799 UNLISTED PULMONARY SVC/PX: CPT

## 2017-08-10 PROCEDURE — 90471 IMMUNIZATION ADMIN: CPT | Performed by: OBSTETRICS & GYNECOLOGY

## 2017-08-10 PROCEDURE — 90715 TDAP VACCINE 7 YRS/> IM: CPT | Performed by: OBSTETRICS & GYNECOLOGY

## 2017-08-10 PROCEDURE — 99024 POSTOP FOLLOW-UP VISIT: CPT | Mod: ,,, | Performed by: OBSTETRICS & GYNECOLOGY

## 2017-08-10 RX ADMIN — OXYCODONE HYDROCHLORIDE AND ACETAMINOPHEN 1 TABLET: 10; 325 TABLET ORAL at 05:08

## 2017-08-10 RX ADMIN — OXYCODONE HYDROCHLORIDE AND ACETAMINOPHEN 1 TABLET: 10; 325 TABLET ORAL at 10:08

## 2017-08-10 RX ADMIN — CLOSTRIDIUM TETANI TOXOID ANTIGEN (FORMALDEHYDE INACTIVATED), CORYNEBACTERIUM DIPHTHERIAE TOXOID ANTIGEN (FORMALDEHYDE INACTIVATED), BORDETELLA PERTUSSIS TOXOID ANTIGEN (GLUTARALDEHYDE INACTIVATED), BORDETELLA PERTUSSIS FILAMENTOUS HEMAGGLUTININ ANTIGEN (FORMALDEHYDE INACTIVATED), BORDETELLA PERTUSSIS PERTACTIN ANTIGEN, AND BORDETELLA PERTUSSIS FIMBRIAE 2/3 ANTIGEN 0.5 ML: 5; 2; 2.5; 5; 3; 5 INJECTION, SUSPENSION INTRAMUSCULAR at 03:08

## 2017-08-10 RX ADMIN — IBUPROFEN 800 MG: 400 TABLET, FILM COATED ORAL at 05:08

## 2017-08-10 RX ADMIN — IBUPROFEN 800 MG: 400 TABLET, FILM COATED ORAL at 02:08

## 2017-08-10 RX ADMIN — FERROUS SULFATE TAB EC 325 MG (65 MG FE EQUIVALENT) 325 MG: 325 (65 FE) TABLET DELAYED RESPONSE at 08:08

## 2017-08-10 RX ADMIN — MAGNESIUM HYDROXIDE 800 MG: 400 SUSPENSION ORAL at 03:08

## 2017-08-10 RX ADMIN — OXYCODONE HYDROCHLORIDE AND ACETAMINOPHEN 1 TABLET: 10; 325 TABLET ORAL at 02:08

## 2017-08-10 RX ADMIN — SIMETHICONE CHEW TAB 80 MG 80 MG: 80 TABLET ORAL at 02:08

## 2017-08-10 RX ADMIN — OXYCODONE HYDROCHLORIDE AND ACETAMINOPHEN 1 TABLET: 10; 325 TABLET ORAL at 01:08

## 2017-08-10 NOTE — DISCHARGE INSTRUCTIONS
"Patient Discharge Instructions for Postpartum Women    Resume Regular Diet  Increase activity gradually, no heavy lifting  Shower  No tampons, douching or sexual intercourse.  Discuss birth control options with your physician.  Wear a support bra  Return to work/school when you've been cleared by a physician    Call your physician if     *Fever of 100.4 or higher  *Persistent nausea/ vomiting  *Incisional drainage  *Heavy vaginal bleeding or large clots (Heavy bleeding is soaking 1 pad in an hour)  *Swelling and pain in arms or legs  *Severe headaches, blurred vision or fainting  *Shortness of breath  *Frequency and burning with urination  *Signs of postpartum depression, discuss these signs with your physician    Call lactation services for questions regarding feeding, nipple and breast care, and general questions about lactation.  They can be reached at [phone]         Understanding Postpartum Depression    You've just had a baby.  You know you should be excited and happy.  But instead you find yourself crying for no reason.  You may have trouble coping with your daily tasks.  You feel sad, tired, and hopeless most of the time.  You may even feel ashamed or guilty.  But what you're going through is not your fault and you can feel better.  Talk to your doctor.  He or she can help.    Depression After Childbirth    You may be weepy and tired right after giving birth.  These feelings are normal.  They're sometimes called the "baby blues."  These blues go away 2-3 weeks.  However, postpartum (meaning "after birth") depression lasts much longer and is more sever than the "baby blues."  It can make you feel sad and hopeless.  You may also fear that your baby will be harmed and worry about being a bad mother.      What is Depression?    Depression is a mood disorder that affects the way you think and feel.  The most common symptom is a feeling of deep sadness.  You may also feel as if you just can't cope with life.  "   Other symptoms include:      * Gaining or loosing weight  * Sleeping too much or too little  * Feeling tired all the time  * Feeling restless  * Fears of harming your baby   * Lack of interest in your baby  * Feeling worthless or guilty  * No longer finding pleasure in things you used to  * Having trouble thinking clearly or making decisions  * Thoughts of hurting yourself or your baby    What Causes Postpartum Depression    The exact causes of postpartum depression isn't known.  It may be due to changes in your hormones during and after childbirth.  You may also be tired from caring for your baby and adjusting to being a mother.  All these factors may make you feel depressed.  In some cases, your genes may also play a role.    Depression Can Be Treated    The good news is that there are many ways to treat postpartum depression.  Talking to your doctor is the first step toward feeling better.    Resources:    * National [place] of Mental Health  -- 688.509.4517    www.nimh.nih.gov    * National [place] on Mental Illness --396.485.3542    Www.samaria.org    * Mental Health [place] -- 651.721.3860     Www.[facility].org    * National Suicide Hotline --206.738.7233 (800-SUICIDE)    7061-6558 [employer]  All rights reserved.  This information is not intended as a substitute for professional medical care.  Always follow up with your healthcare professional's instructions.          Breastfeeding Discharge Instructions       Feed the baby at the earliest sign of hunger or comfort  o Hands to mouth, sucking motions  o Rooting or searching for something to suck on  o Dont wait for crying - it is a sign of distress     The feedings may be 8-12 times per 24hrs and will not follow a schedule   Avoid pacifiers and bottles for the first 4 weeks   Alternate the breast you start the feeding with, or start with the breast that feels the fullest   Switch breasts when the baby takes himself off the breast or falls  asleep   Keep offering breasts until the baby looks full, no longer gives hunger signs, and stays asleep when placed on his back in the crib   If the baby is sleepy and wont wake for a feeding, put the baby skin-to-skin dressed in a diaper against the mothers bare chest   Sleep near your baby   The baby should be positioned and latched on to the breast correctly  o Chest-to-chest, chin in the breast  o Babys lips are flipped outward  o Babys mouth is stretched open wide like a shout  o Babys sucking should feel like tugging to the mother  - The baby should be drinking at the breast:  o You should hear swallowing or gulping throughout the feeding  o You should see milk on the babys lips when he comes off the breast  o Your breasts should be softer when the baby is finished feeding  o The baby should look relaxed at the end of feedings  o After the 4th day and your milk is in:  o The babys poop should turn bright yellow and be loose, watery, and seedy  o The baby should have at least 3-4 poops the size of the palm of your hand per day  o The baby should have at least 5-6 wet diapers per day  o The urine should be light yellow in color  You should drink when you are thirsty and eat a healthy diet when you are    hungry.     Take naps to get the rest you need.   Take medications and/or drink alcohol only with permission of your obstetrician    or the babys pediatrician.  You can also call the Infant Risk Center,   ([phone]), Monday-Friday, 8am-5pm Central time, to get the most   up-to-date evidence-based information on the use of medications during   pregnancy and breastfeeding.      The baby should be examined by a pediatrician at 3-5 days of age.  Once your   milk comes in, the baby should be gaining at least ½ - 1oz each day and should be back to birthweight no later than 10-14 days of age.          Community Resources    [facility] Breastfeeding Warmline: 670.129.1444  [facility]  clinics: provide incentives and breastpumps to eligible mothers  [employer] (LLLI):  mother-to-mother support group website        www.lll.org  [facility] [employer] mother-to-mother support groups:        www.lljaynaGodigex.[employer]        [employer] [facility]   Dr. Ari Sim website for latch videos and general information:        www.breastfeedinginc.ca  [facility] Risk [facility] is a call center that provides information about the safety of taking medications while breastfeeding.  Call 1-109.304.5157, M-F, 8am-5pm, [place].  International Lactation Consultant Association provides resources for assistance:        www.ilca.org  [facility] Breastfeeding Coalition provides informationand resources for parents  and the community    http://ChristianaCareastfeeding.org     Elif Guerrero is a mom-to-mom support group:                             www.[employer]//breastfeedng-support/  [employer] for Healthy Babies:  4-660-484-BABY(5347)  [employer]: a breastfeeding support group for women of color, 161.650.9654

## 2017-08-10 NOTE — PLAN OF CARE
Problem: Breastfeeding (Adult,Obstetrics,Pediatric)  Goal: Signs and Symptoms of Listed Potential Problems Will be Absent, Minimized or Managed (Breastfeeding)  Signs and symptoms of listed potential problems will be absent, minimized or managed by discharge/transition of care (reference Breastfeeding (Adult,Obstetrics,Pediatric) CPG).   Outcome: Outcome(s) achieved Date Met: 08/10/17  Mother will breastfeed on cue at least eight or more times in 24 hours. Will keep track of feedings and wet and dirty diapers. Will call with any breastfeeding needs.

## 2017-08-10 NOTE — PLAN OF CARE
Problem: Patient Care Overview  Goal: Plan of Care Review  Outcome: Ongoing (interventions implemented as appropriate)  IS instruction given with good patient understanding and effort.

## 2017-08-10 NOTE — PROGRESS NOTES
Progress Note  OB/GYN  Admit Date: 8/8/2017   LOS: 2 days   SUBJECTIVE:     Patient seen and examined this AM. DIANEON. Patient states pain has improved since yesterday. States she is tolerating a diet. She feels better overall than yesterday.    Continuous Infusions:   fentaNYL 2 mcg/mL / ROPIvacaine 0.2% in 200mL 0.9% Sodium Chloride 200mL premixed Bag      lactated Ringers      lactated Ringers      lanolin       Scheduled Meds:   bupivacaine (PF) 0.25% (2.5 mg/ml)  30 mL Subcutaneous Once    ferrous sulfate  325 mg Oral BID    HYDROmorphone  0.5 mg Intravenous Once    ibuprofen  800 mg Oral TID    lactated ringers  1,000 mL Intravenous Once    nalbuphine  2.6 mg Intravenous Once    ondansetron  4 mg Intravenous Once     PRN Meds:benzocaine-lanolin-aloe vera, ceFAZolin (ANCEF) IVPB, citric acid-sodium citrate 500-334 mg/5 ml, diphenhydrAMINE, diphenhydrAMINE, diphenhydrAMINE, diphenhydrAMINE, famotidine (PF), hydrocortisone, lactated ringers, lanolin, magnesium hydroxide 400 mg/5 ml, measles, mumps and rubella vaccine, metoclopramide HCl, misoprostol, ondansetron, oxycodone, oxycodone-acetaminophen, oxycodone-acetaminophen, promethazine (PHENERGAN) IVPB, promethazine (PHENERGAN) IVPB, rho(D) immune globulin, senna-docusate 8.6-50 mg, simethicone, DIPH,PERTUS (ADACEL),TETANUS PF VAC (ADULT)    Allergies:   Review of patient's allergies indicates:   Allergen Reactions    Strawberries [strawberry] Anaphylaxis    Penicillins Rash       ROS  As per subjective  OBJECTIVE:   Vital Signs (Most Recent)  Temp: 98.2 °F (36.8 °C) (08/10/17 1200)  Pulse: 86 (08/10/17 1200)  Resp: 18 (08/10/17 1200)  BP: 122/83 (08/10/17 1200)  SpO2: 98 % (08/10/17 1200)  Vital Signs Range (Last 24H):  Temp:  [97.2 °F (36.2 °C)-98.3 °F (36.8 °C)]   Pulse:  [84-99]   Resp:  [17-20]   BP: (120-132)/(63-83)   SpO2:  [98 %-100 %]     I & O (Last 24H):No intake or output data in the 24 hours ending 08/10/17 1305  Wt Readings from Last 3  Encounters:   17 81.6 kg (180 lb)   17 81.7 kg (180 lb 3.2 oz)   17 81.6 kg (179 lb 14.3 oz)          Current Diet Order   Procedures    Diet Adult Regular    Diet general        Lines/Drains:       Peripheral IV - Single Lumen 17 0651 Left Wrist (Active)   Site Assessment Clean;Dry;Intact;No redness;No swelling 2017  7:50 PM   Line Status Infusing 2017  7:50 PM   Dressing Status Clean;Dry;Intact 2017  7:50 PM   Number of days: 1       Physical Exam   Constitutional: She is oriented to person, place, and time.   HENT:   Head: Normocephalic and atraumatic.   Neck: Neck supple. No JVD present.   Cardiovascular: Normal rate, regular rhythm, normal heart sounds and intact distal pulses.  Exam reveals no gallop and no friction rub.    No murmur heard.  Pulmonary/Chest: Effort normal and breath sounds normal. No respiratory distress. She has no wheezes.   Abdominal: Soft. Bowel sounds are normal. There is tenderness (around bandaged area).   Incision is bandaged   Musculoskeletal: She exhibits no edema.   Neurological: She is alert and oriented to person, place, and time.     Laboratory Data:  CBC    Recent Labs  Lab 17  0620 17  0448   WBC 7.26 8.01   RBC 4.70 3.60*   HGB 9.4* 7.3*   HCT 31.9* 24.6*    148*   MCV 68* 68*   MCH 20.0* 20.3*   MCHC 29.5* 29.7*         ASSESSMENT/PLAN:   Evie Bishop is a 36 y.o. female  POD#2 s/p RLTCD with BTL.    Plan:  - continue pain mgmt  - rafaela was dc in AM. F/u UOP.  - H/H is 7.3/24.6 yesterday. Was started on ferrous sulfate. Hold transfusion for now.  - continue progressive care  - possible DC tomorrow.      Damir Cunningham MD  [facility]3 [facility]  8/10/2017 7:32 AM

## 2017-08-10 NOTE — LACTATION NOTE
08/10/17 0850   Maternal Infant Assessment   Breast Density full   Areola elastic   Nipple(s) everted   Infant Assessment   Weight Loss (%) 5.5       Number Scale   Presence of Pain denies  (when breastfeeding)   Location nipple(s)   Maternal Infant Feeding   Maternal Preparation breast care;hand hygiene   Maternal Emotional State independent;relaxed   Signs of Milk Transfer audible swallow  (gulping)   Time Spent (min) 0-15 min   Latch Assistance no   Breastfeeding Education importance of skin-to-skin contact   Feeding Infant   Feeding Readiness Cues eager;energy for feeding   Effective Latch During Feeding yes  (per pt)   Audible Swallow yes   Lactation Referrals   Lactation Consult Breastfeeding assessment   Lactation Interventions   Attachment Promotion family involvement promoted   Breastfeeding Assistance feeding cue recognition promoted;feeding on demand promoted   Maternal Breastfeeding Support encouragement offered;infant-mother separation minimized

## 2017-08-11 ENCOUNTER — TELEPHONE (OUTPATIENT)
Dept: OBSTETRICS AND GYNECOLOGY | Facility: CLINIC | Age: 37
End: 2017-08-11
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
## 2017-08-11 NOTE — TELEPHONE ENCOUNTER
Pt is concerned that her incision has a spot that is open, and noticed a little oozing on her underwear near that spot. Should she go to ER for that? No pain or bleeding

## 2017-08-11 NOTE — PLAN OF CARE
Problem: Patient Care Overview  Goal: Plan of Care Review  Outcome: Outcome(s) achieved Date Met: 08/10/17  Outcomes met for discharge.  Pt C/o of pain on left side of abd.  Concerned because she had an infection with last C/S.  Dr. Wiedemann examined this a.m. prior to discharge order for this afternoon.  Pt offered to stay another night so that she could see Dr. Russ in the morning or to call his office to see him or ask questions about surgery.  Instructions about incision care and sign/simptoms of infection given. Pain management strategies discussed.  Pt wish to go home.  Pt called Dr Veronica's office for an appointment to have staples removed.  States she will call him if pain becomes worse and not relieved with pain medications  Discharge instructions given verbally and in writing.  Verbalized understanding.  Received Mother-Baby care guide during hospital stay.  Prescriptions given with explanation for use.  Verbalized understanding.  CDC vaccine information statement on Tdap given and risk/benifits of vaccine discussed.  Tdap  Givenper pt. request.  States she feels comfortable taking care of baby and has demonstrated ability to care for  and herself.  Breastfeeding very well. Says she will have assistance when she returns home.  Discharged to home in stable condition via  W/c with infant in arms.

## 2017-08-14 ENCOUNTER — OFFICE VISIT (OUTPATIENT)
Dept: OBSTETRICS AND GYNECOLOGY | Facility: CLINIC | Age: 37
End: 2017-08-14
Payer: COMMERCIAL

## 2017-08-14 VITALS — HEIGHT: 63 IN | WEIGHT: 169 LBS | BODY MASS INDEX: 29.95 KG/M2

## 2017-08-14 DIAGNOSIS — Z48.02 REMOVAL OF STAPLE: Primary | ICD-10-CM

## 2017-08-14 PROCEDURE — 99499 UNLISTED E&M SERVICE: CPT | Mod: S$GLB,,, | Performed by: OBSTETRICS & GYNECOLOGY

## 2017-08-14 PROCEDURE — 99999 PR PBB SHADOW E&M-EST. PATIENT-LVL II: CPT | Mod: PBBFAC,,, | Performed by: OBSTETRICS & GYNECOLOGY

## 2017-08-14 RX ORDER — HYDROCODONE BITARTRATE AND ACETAMINOPHEN 5; 325 MG/1; MG/1
TABLET ORAL
Refills: 0 | COMMUNITY
Start: 2017-08-10 | End: 2017-09-18

## 2017-08-14 RX ORDER — IBUPROFEN 800 MG/1
TABLET ORAL
Refills: 0 | COMMUNITY
Start: 2017-08-10 | End: 2017-09-18

## 2017-08-14 RX ORDER — FERROUS SULFATE 324(65)MG
325 TABLET, DELAYED RELEASE (ENTERIC COATED) ORAL DAILY
COMMUNITY
End: 2017-09-18

## 2017-08-14 NOTE — PROGRESS NOTES
Inc looks good  misaligneds in midline due to scarring  Staples removed steri strips applied  rtc 5 wks

## 2017-09-18 ENCOUNTER — OFFICE VISIT (OUTPATIENT)
Dept: OBSTETRICS AND GYNECOLOGY | Facility: CLINIC | Age: 37
End: 2017-09-18
Payer: COMMERCIAL

## 2017-09-18 VITALS
DIASTOLIC BLOOD PRESSURE: 64 MMHG | BODY MASS INDEX: 29.41 KG/M2 | WEIGHT: 166 LBS | HEIGHT: 63 IN | SYSTOLIC BLOOD PRESSURE: 106 MMHG

## 2017-09-18 PROBLEM — O34.219 PREVIOUS CESAREAN DELIVERY AFFECTING PREGNANCY: Status: RESOLVED | Noted: 2017-08-08 | Resolved: 2017-09-18

## 2017-09-18 PROBLEM — O09.522 ELDERLY MULTIGRAVIDA IN SECOND TRIMESTER: Status: RESOLVED | Noted: 2017-03-21 | Resolved: 2017-09-18

## 2017-09-18 PROCEDURE — 99999 PR PBB SHADOW E&M-EST. PATIENT-LVL II: CPT | Mod: PBBFAC,,, | Performed by: OBSTETRICS & GYNECOLOGY

## 2017-09-18 PROCEDURE — 0503F POSTPARTUM CARE VISIT: CPT | Mod: S$GLB,,, | Performed by: OBSTETRICS & GYNECOLOGY

## 2017-09-18 NOTE — PROGRESS NOTES
"CC: Post-partum follow-up    Evie Bishop is a 36 y.o. female  presents for post-partum visit s/p a c/s and BTL.    Delivery Date: 2017  Delivery MD: Sanjuana  Gender: male   Name:   Birth Weight: 7 pounds 12 ounces  Breast Feeding: YES  Depression: NO  Contraception: bilateral tubal ligation    Pregnancy was complicated by:  None    Past Medical History:   Diagnosis Date    Tachycardia      Past Surgical History:   Procedure Laterality Date     SECTION      x2    fatty tumor removal      TONSILLECTOMY      TUBAL LIGATION       Social History   Substance Use Topics    Smoking status: Never Smoker    Smokeless tobacco: Never Used    Alcohol use No      Comment: rarely     Family History   Problem Relation Age of Onset    Heart disease Paternal Grandmother     Cancer Maternal Grandmother     Uterine cancer Maternal Grandmother     Hypertension Father     Hypertension Mother      OB History    Para Term  AB Living   3 3 3     3   SAB TAB Ectopic Multiple Live Births         0 3      # Outcome Date GA Lbr Ford/2nd Weight Sex Delivery Anes PTL Lv   3 Term 17 39w1d  3.515 kg (7 lb 12 oz) M CS-LTranv Spinal N TANGELA   2 Term  40w0d  3.345 kg (7 lb 6 oz) M CS-LTranv  N TANGELA   1 Term  37w0d  2.58 kg (5 lb 11 oz) M CS-LTranv   TANGELA      Complications: Failure to Progress in First Stage          Current Outpatient Prescriptions:     PRENATAL VIT CALC,IRON,FOLIC (PRENATAL VITAMIN ORAL), Take 1 tablet by mouth Daily., Disp: , Rfl:      /64   Ht 5' 3" (1.6 m)   Wt 75.3 kg (166 lb)   Breastfeeding? Yes   BMI 29.41 kg/m²     ROS:  GENERAL: No fever, chills, fatigability or weight loss.  VULVAR: No pain, no lesions and no itching.  VAGINAL: No relaxation, no itching, no discharge, no abnormal bleeding and no lesions.  ABDOMEN: No abdominal pain. Denies nausea. Denies vomiting. No diarrhea. No constipation  BREAST: Denies pain. No lumps. No discharge.  URINARY: " No incontinence, no nocturia, no frequency and no dysuria.  CARDIOVASCULAR: No chest pain. No shortness of breath. No leg cramps.  NEUROLOGICAL: No headaches. No vision changes.    PE:   APPEARANCE: Well nourished, well developed, in no acute distress.  NECK: Neck symmetric without  thyromegaly.  CHEST: Lungs clear to auscultation.  HEART: Regular rate and rhythm, no murmurs, rubs or gallops.  ABDOMEN: Soft. No tenderness or masses. No hernias.  BREASTS: Symmetrical, no skin changes or visible lesions. No palpable masses, nipple discharge or adenopathy bilaterally.  PELVIC: External female genitalia without lesions. Normal hair distribution. Adequate perineal body, normal urethral meatus. Vagina moist and well rugated without lesions or discharge. Cervix pink and without lesions. No significant cystocele or rectocele. Bimanual exam showed uterus normal size, shape, position, mobile and nontender. Adnexa without masses or tenderness. Urethra and bladder normal.  EXTREMITIES: No edema.      ASSESSMENT:  1. Postpartum Exam    PLAN:  RTO 1/18 for annual           Patient was counseled today on A.C.S. Pap guidelines and recommendations for yearly pelvic exams and monthly self breast exams; to see her PCP for other health maintenance and contraception options.

## 2017-11-16 ENCOUNTER — PATIENT MESSAGE (OUTPATIENT)
Dept: OBSTETRICS AND GYNECOLOGY | Facility: CLINIC | Age: 37
End: 2017-11-16

## 2017-12-03 ENCOUNTER — PATIENT MESSAGE (OUTPATIENT)
Dept: OBSTETRICS AND GYNECOLOGY | Facility: CLINIC | Age: 37
End: 2017-12-03

## 2017-12-04 ENCOUNTER — PATIENT MESSAGE (OUTPATIENT)
Dept: OBSTETRICS AND GYNECOLOGY | Facility: CLINIC | Age: 37
End: 2017-12-04

## 2017-12-04 ENCOUNTER — TELEPHONE (OUTPATIENT)
Dept: OBSTETRICS AND GYNECOLOGY | Facility: CLINIC | Age: 37
End: 2017-12-04

## 2017-12-04 RX ORDER — TRANEXAMIC ACID 650 MG/1
650 TABLET ORAL 3 TIMES DAILY
Qty: 15 TABLET | Refills: 0 | Status: SHIPPED | OUTPATIENT
Start: 2017-12-04 | End: 2019-06-26

## 2018-02-07 ENCOUNTER — PATIENT MESSAGE (OUTPATIENT)
Dept: OBSTETRICS AND GYNECOLOGY | Facility: CLINIC | Age: 38
End: 2018-02-07

## 2018-02-07 NOTE — TELEPHONE ENCOUNTER
Called in protocol diflucan 150 mg one by mouth daily # 2 no refills for pt complaining of yeast infection symptoms. Instructed pt to call back if symptoms persist after treatment.

## 2018-04-02 ENCOUNTER — OFFICE VISIT (OUTPATIENT)
Dept: OBSTETRICS AND GYNECOLOGY | Facility: CLINIC | Age: 38
End: 2018-04-02
Payer: COMMERCIAL

## 2018-04-02 VITALS
BODY MASS INDEX: 26.01 KG/M2 | SYSTOLIC BLOOD PRESSURE: 114 MMHG | HEIGHT: 63 IN | DIASTOLIC BLOOD PRESSURE: 80 MMHG | WEIGHT: 146.81 LBS

## 2018-04-02 DIAGNOSIS — Z01.419 WELL WOMAN EXAM WITH ROUTINE GYNECOLOGICAL EXAM: ICD-10-CM

## 2018-04-02 DIAGNOSIS — Z12.4 ROUTINE PAPANICOLAOU SMEAR: ICD-10-CM

## 2018-04-02 DIAGNOSIS — B37.31 YEAST VAGINITIS: Primary | ICD-10-CM

## 2018-04-02 PROCEDURE — 87491 CHLMYD TRACH DNA AMP PROBE: CPT

## 2018-04-02 PROCEDURE — 88175 CYTOPATH C/V AUTO FLUID REDO: CPT

## 2018-04-02 PROCEDURE — 99395 PREV VISIT EST AGE 18-39: CPT | Mod: S$GLB,,, | Performed by: OBSTETRICS & GYNECOLOGY

## 2018-04-02 PROCEDURE — 99999 PR PBB SHADOW E&M-EST. PATIENT-LVL III: CPT | Mod: PBBFAC,,, | Performed by: OBSTETRICS & GYNECOLOGY

## 2018-04-02 PROCEDURE — 87480 CANDIDA DNA DIR PROBE: CPT

## 2018-04-02 PROCEDURE — 87510 GARDNER VAG DNA DIR PROBE: CPT

## 2018-04-02 RX ORDER — ZOLPIDEM TARTRATE 5 MG/1
5 TABLET ORAL NIGHTLY
COMMUNITY
Start: 2018-01-26 | End: 2022-03-14 | Stop reason: DRUGHIGH

## 2018-04-02 RX ORDER — FLUCONAZOLE 150 MG/1
150 TABLET ORAL DAILY
Qty: 1 TABLET | Refills: 1 | Status: SHIPPED | OUTPATIENT
Start: 2018-04-02 | End: 2018-04-04 | Stop reason: SDUPTHER

## 2018-04-02 RX ORDER — FLUCONAZOLE 150 MG/1
150 TABLET ORAL ONCE
Qty: 1 TABLET | Refills: 1 | Status: SHIPPED | OUTPATIENT
Start: 2018-04-02 | End: 2018-04-02

## 2018-04-02 RX ORDER — PHENTERMINE HYDROCHLORIDE 37.5 MG/1
37.5 TABLET ORAL
COMMUNITY
End: 2018-10-01

## 2018-04-02 NOTE — PROGRESS NOTES
"CC:annual  Chief Complaint   Patient presents with    Vaginal Discharge    Well Woman     with gynecological exam       HPI:    37 y.o.   OB History      Para Term  AB Living    3 3 3     3    SAB TAB Ectopic Multiple Live Births          0 3        Complaining of: yeast infxns for past 3 months starting on day 14 of cycle      (Not in a hospital admission)    Review of patient's allergies indicates:   Allergen Reactions    Strawberries [strawberry] Anaphylaxis    Penicillins Rash        Past Medical History:   Diagnosis Date    Tachycardia      Past Surgical History:   Procedure Laterality Date     SECTION      x2    fatty tumor removal      TONSILLECTOMY      TUBAL LIGATION       Family History   Problem Relation Age of Onset    Heart disease Paternal Grandmother     Cancer Maternal Grandmother     Uterine cancer Maternal Grandmother     Hypertension Father     Hypertension Mother      Social History   Substance Use Topics    Smoking status: Never Smoker    Smokeless tobacco: Never Used    Alcohol use Yes      Comment: rarely     ROS:  GENERAL: Feeling well overall. Denies fever or chills.   SKIN: Denies rash or lesions.   HEAD: Denies head injury or headache.   NODES: Denies enlarged lymph nodes.   CHEST: Denies chest pain or shortness of breath.   CARDIOVASCULAR: Denies palpitations or left sided chest pain.    ABDOMEN: Denies diarrhea, nausea, vomiting or rectal bleeding.   URINARY: No dysuria, hematuria, or burning on urination.  REPRODUCTIVE: See HPI.   BREASTS: Denies pain, lumps, or nipple discharge.   HEMATOLOGIC: No easy bruisability or excessive bleeding.   MUSCULOSKELETAL: Denies joint pain or swelling.   NEUROLOGIC: Denies syncope or weakness.   PSYCHIATRIC: Denies depression, anxiety or mood swings.      PE: /80   Ht 5' 3" (1.6 m)   Wt 66.6 kg (146 lb 12.8 oz)   LMP 03/15/2018 (Exact Date)   Breastfeeding? No   BMI 26.00 kg/m²      APPEARANCE: Well " nourished, well developed, in no acute distress.  SKIN: Normal skin turgor, no lesions.  NECK: Neck symmetric without masses or thyromegaly.  NODES: No inguinal, cervical, axillary or femoral lymph node enlargement.  CARDIOVASCULAR: Normal S1, S2. No rubs, murmurs or gallops.  NEUROLOGIC: Normal mood and affect. No depression or anxiety.   ABDOMEN: Soft. No tenderness or masses. No hepatosplenomegaly. No hernias.  RESPIRATORY: Normal respiratory effort with no retractions or use of accessory muscles.  BREASTS: Symmetrical, no skin changes or visible lesions. No palpable masses, nipple discharge, or adenopathy bilaterally.   BLADDER: Non-tender  GENITALIA: normal external genitalia, no erythema, no discharge  URETHRA: normal urethra, normal urethral meatus  VAGINA: vaginal discharge copious, white and thick  CERVIX: no lesions or cervical motion tenderness  UTERUS: normal  ADNEXA: normal adnexa    ASSESSMENT/ PLAN    Evie was seen today for vaginal discharge and well woman.    Diagnoses and all orders for this visit:    Yeast vaginitis    Well woman exam with routine gynecological exam  -     Liquid-based pap smear, screening  -     Vaginosis Screen by DNA Probe  -     C. trachomatis/N. gonorrhoeae by AMP DNA Cervix    Routine Papanicolaou smear    Other orders  -     fluconazole (DIFLUCAN) 150 MG Tab; Take 1 tablet (150 mg total) by mouth once.  -     fluconazole (DIFLUCAN) 150 MG Tab; Take 1 tablet (150 mg total) by mouth once daily.      Probiotics, BA supp  If that doesn/t work will consider cont ocp's      Jose Elias Wei MD

## 2018-04-03 LAB
CANDIDA RRNA VAG QL PROBE: POSITIVE
G VAGINALIS RRNA GENITAL QL PROBE: NEGATIVE
T VAGINALIS RRNA GENITAL QL PROBE: NEGATIVE

## 2018-04-04 ENCOUNTER — TELEPHONE (OUTPATIENT)
Dept: OBSTETRICS AND GYNECOLOGY | Facility: CLINIC | Age: 38
End: 2018-04-04

## 2018-04-04 LAB
C TRACH DNA SPEC QL NAA+PROBE: NOT DETECTED
N GONORRHOEA DNA SPEC QL NAA+PROBE: NOT DETECTED

## 2018-04-04 RX ORDER — FLUCONAZOLE 150 MG/1
150 TABLET ORAL DAILY
Qty: 3 TABLET | Refills: 3 | Status: SHIPPED | OUTPATIENT
Start: 2018-04-04 | End: 2018-04-05

## 2018-05-03 ENCOUNTER — PATIENT MESSAGE (OUTPATIENT)
Dept: OBSTETRICS AND GYNECOLOGY | Facility: CLINIC | Age: 38
End: 2018-05-03

## 2018-05-15 ENCOUNTER — PATIENT MESSAGE (OUTPATIENT)
Dept: OBSTETRICS AND GYNECOLOGY | Facility: CLINIC | Age: 38
End: 2018-05-15

## 2018-05-15 NOTE — TELEPHONE ENCOUNTER
Diflucan 150mg dispense 2 take 1 po and repeat after 72 hrs if no improvement no refills per protocol.

## 2018-06-13 ENCOUNTER — PATIENT MESSAGE (OUTPATIENT)
Dept: OBSTETRICS AND GYNECOLOGY | Facility: CLINIC | Age: 38
End: 2018-06-13

## 2018-06-13 RX ORDER — FLUCONAZOLE 150 MG/1
TABLET ORAL
Qty: 2 TABLET | Refills: 1 | Status: SHIPPED | OUTPATIENT
Start: 2018-06-13 | End: 2019-02-27

## 2018-06-13 RX ORDER — FLUCONAZOLE 150 MG/1
TABLET ORAL
Refills: 0 | COMMUNITY
Start: 2018-05-15 | End: 2018-06-13 | Stop reason: SDUPTHER

## 2018-08-30 RX ORDER — FLUCONAZOLE 150 MG/1
TABLET ORAL
Qty: 2 TABLET | Refills: 1 | Status: CANCELLED | OUTPATIENT
Start: 2018-08-30

## 2018-08-30 NOTE — TELEPHONE ENCOUNTER
Diflucan 150mg disp 2 take 1 po and repeat after 72hrs if no improvement no refills per protocol.

## 2018-09-26 ENCOUNTER — PATIENT MESSAGE (OUTPATIENT)
Dept: OBSTETRICS AND GYNECOLOGY | Facility: CLINIC | Age: 38
End: 2018-09-26

## 2018-09-27 ENCOUNTER — PATIENT MESSAGE (OUTPATIENT)
Dept: OBSTETRICS AND GYNECOLOGY | Facility: CLINIC | Age: 38
End: 2018-09-27

## 2018-09-27 NOTE — TELEPHONE ENCOUNTER
Diflucan 150mg disp 2 take 1 po and repeat after 72 hrs if no improvement no refills per protocol.

## 2018-10-01 ENCOUNTER — OFFICE VISIT (OUTPATIENT)
Dept: OBSTETRICS AND GYNECOLOGY | Facility: CLINIC | Age: 38
End: 2018-10-01
Payer: COMMERCIAL

## 2018-10-01 VITALS
BODY MASS INDEX: 23.6 KG/M2 | WEIGHT: 133.19 LBS | DIASTOLIC BLOOD PRESSURE: 80 MMHG | HEIGHT: 63 IN | SYSTOLIC BLOOD PRESSURE: 112 MMHG

## 2018-10-01 DIAGNOSIS — N89.8 VAGINAL DISCHARGE: Primary | ICD-10-CM

## 2018-10-01 DIAGNOSIS — N76.1 CHRONIC VAGINITIS: ICD-10-CM

## 2018-10-01 DIAGNOSIS — N60.19 FIBROCYSTIC BREAST CHANGES, UNSPECIFIED LATERALITY: ICD-10-CM

## 2018-10-01 DIAGNOSIS — N63.10 BREAST MASS, RIGHT: ICD-10-CM

## 2018-10-01 PROCEDURE — 3008F BODY MASS INDEX DOCD: CPT | Mod: CPTII,S$GLB,, | Performed by: OBSTETRICS & GYNECOLOGY

## 2018-10-01 PROCEDURE — 87660 TRICHOMONAS VAGIN DIR PROBE: CPT

## 2018-10-01 PROCEDURE — 99213 OFFICE O/P EST LOW 20 MIN: CPT | Mod: S$GLB,,, | Performed by: OBSTETRICS & GYNECOLOGY

## 2018-10-01 PROCEDURE — 99999 PR PBB SHADOW E&M-EST. PATIENT-LVL III: CPT | Mod: PBBFAC,,, | Performed by: OBSTETRICS & GYNECOLOGY

## 2018-10-01 RX ORDER — DEXTROAMPHETAMINE SACCHARATE, AMPHETAMINE ASPARTATE, DEXTROAMPHETAMINE SULFATE AND AMPHETAMINE SULFATE 3.75; 3.75; 3.75; 3.75 MG/1; MG/1; MG/1; MG/1
15 TABLET ORAL DAILY
COMMUNITY
Start: 2018-09-26 | End: 2022-03-14

## 2018-10-01 RX ORDER — DIAZEPAM 2 MG/1
TABLET ORAL
COMMUNITY
Start: 2018-09-26 | End: 2019-02-27

## 2018-10-01 NOTE — PROGRESS NOTES
CC:  Chief Complaint   Patient presents with    Vaginal Pain    Vaginal Discharge       HPI:    37 y.o.   OB History      Para Term  AB Living    3 3 3     3    SAB TAB Ectopic Multiple Live Births          0 3        Complaining of: recurrent yeast infxns occurring 1-2 wks bf cycle each month since April, has been using diflucan monthly with initally some relief but now isn't working as well, stopped BA supp as well due to irritation. Taking a probiotic as well. Has itching and irrittation now but no d/c, cycle is due this week  Also c/o breast lump on rt side      (Not in a hospital admission)    Review of patient's allergies indicates:   Allergen Reactions    Strawberries [strawberry] Anaphylaxis    Penicillins Rash        Past Medical History:   Diagnosis Date    Tachycardia      Past Surgical History:   Procedure Laterality Date     SECTION      x2    DELIVERY- SECTION WITH TUBAL N/A 2017    Performed by [facility] at [facility] L&D    fatty tumor removal      TONSILLECTOMY      TUBAL LIGATION       Family History   Problem Relation Age of Onset    Heart disease Paternal Grandmother     Cancer Maternal Grandmother     Uterine cancer Maternal Grandmother     Hypertension Father     Hypertension Mother      Social History     Tobacco Use    Smoking status: Never Smoker    Smokeless tobacco: Never Used   Substance Use Topics    Alcohol use: Yes     Comment: rarely    Drug use: No     ROS:  GENERAL: Feeling well overall. Denies fever or chills.   SKIN: Denies rash or lesions.   HEAD: Denies head injury or headache.   NODES: Denies enlarged lymph nodes.   CHEST: Denies chest pain or shortness of breath.   CARDIOVASCULAR: Denies palpitations or left sided chest pain.    ABDOMEN: Denies diarrhea, nausea, vomiting or rectal bleeding.   URINARY: No dysuria, hematuria, or burning on urination.  REPRODUCTIVE: See HPI.   BREASTS: Denies pain, lumps, or nipple  "discharge.   HEMATOLOGIC: No easy bruisability or excessive bleeding.   MUSCULOSKELETAL: Denies joint pain or swelling.   NEUROLOGIC: Denies syncope or weakness.   PSYCHIATRIC: Denies depression, anxiety or mood swings.      PE: /80   Ht 5' 3" (1.6 m)   Wt 60.4 kg (133 lb 3.2 oz)   LMP 09/05/2018   Breastfeeding? No   BMI 23.60 kg/m²      APPEARANCE: Well nourished, well developed, in no acute distress.  SKIN: Normal skin turgor, no lesions.  NECK: Neck symmetric without masses or thyromegaly.  NODES: No inguinal, cervical, axillary or femoral lymph node enlargement.  CARDIOVASCULAR: Normal S1, S2. No rubs, murmurs or gallops.  NEUROLOGIC: Normal mood and affect. No depression or anxiety.   ABDOMEN: Soft. No tenderness or masses. No hepatosplenomegaly. No hernias.  RESPIRATORY: Normal respiratory effort with no retractions or use of accessory muscles.  BREASTS: Symmetrical, no skin changes or visible lesions.Extensive FBC breast changes in upper rt breast with a deep palpable mass at 12 o'clock, nipple discharge, or adenopathy bilaterally.   BLADDER: Non-tender  GENITALIA: normal external genitalia, no erythema, no discharge  URETHRA: normal urethra, normal urethral meatus  VAGINA: vaginal discharge scant and white  CERVIX: no lesions or cervical motion tenderness  UTERUS: normal  ADNEXA: normal adnexa    ASSESSMENT/ PLAN    Evie was seen today for vaginal pain and vaginal discharge.    Diagnoses and all orders for this visit:    Vaginal discharge  -     Vaginosis Screen by DNA Probe    Fibrocystic breast changes, unspecified laterality    Chronic vaginitis    Breast mass, right      truial of lomedia for cx mucus changes which may be misinterpretted as yeast  Stop diflucal, cont BA supp and probiotics  Recheck in 3 months      Jose Elias Wei MD  "

## 2018-10-16 ENCOUNTER — PATIENT MESSAGE (OUTPATIENT)
Dept: OBSTETRICS AND GYNECOLOGY | Facility: CLINIC | Age: 38
End: 2018-10-16

## 2018-11-29 ENCOUNTER — PATIENT MESSAGE (OUTPATIENT)
Dept: OBSTETRICS AND GYNECOLOGY | Facility: CLINIC | Age: 38
End: 2018-11-29

## 2018-11-29 RX ORDER — NORETHINDRONE ACETATE AND ETHINYL ESTRADIOL AND FERROUS FUMARATE 1MG-20(24)
1 KIT ORAL DAILY
Qty: 90 TABLET | Refills: 3 | Status: SHIPPED | OUTPATIENT
Start: 2018-11-29 | End: 2019-06-26 | Stop reason: SDUPTHER

## 2019-02-25 DIAGNOSIS — E21.1 SECONDARY HYPERPARATHYROIDISM, NON-RENAL: Primary | ICD-10-CM

## 2019-02-26 ENCOUNTER — HOSPITAL ENCOUNTER (OUTPATIENT)
Dept: RADIOLOGY | Facility: HOSPITAL | Age: 39
Discharge: HOME | End: 2019-02-26
Payer: COMMERCIAL

## 2019-02-26 DIAGNOSIS — E21.1 SECONDARY HYPERPARATHYROIDISM, NON-RENAL: ICD-10-CM

## 2019-02-26 PROCEDURE — 76536 US EXAM OF HEAD AND NECK: CPT | Mod: TC,PO

## 2019-02-27 ENCOUNTER — OFFICE VISIT (OUTPATIENT)
Dept: OBSTETRICS AND GYNECOLOGY | Facility: CLINIC | Age: 39
End: 2019-02-27
Payer: COMMERCIAL

## 2019-02-27 VITALS
DIASTOLIC BLOOD PRESSURE: 78 MMHG | BODY MASS INDEX: 22.04 KG/M2 | WEIGHT: 124.38 LBS | SYSTOLIC BLOOD PRESSURE: 120 MMHG

## 2019-02-27 DIAGNOSIS — D50.9 MICROCYTIC ANEMIA: Primary | ICD-10-CM

## 2019-02-27 DIAGNOSIS — Z79.890 HORMONE REPLACEMENT THERAPY (HRT): ICD-10-CM

## 2019-02-27 DIAGNOSIS — N95.2 ATROPHIC VAGINITIS: ICD-10-CM

## 2019-02-27 DIAGNOSIS — N89.8 VAGINAL DISCHARGE: ICD-10-CM

## 2019-02-27 PROCEDURE — 99214 OFFICE O/P EST MOD 30 MIN: CPT | Mod: S$GLB,,, | Performed by: OBSTETRICS & GYNECOLOGY

## 2019-02-27 PROCEDURE — 87510 GARDNER VAG DNA DIR PROBE: CPT

## 2019-02-27 PROCEDURE — 99214 PR OFFICE/OUTPT VISIT, EST, LEVL IV, 30-39 MIN: ICD-10-PCS | Mod: S$GLB,,, | Performed by: OBSTETRICS & GYNECOLOGY

## 2019-02-27 PROCEDURE — 3008F BODY MASS INDEX DOCD: CPT | Mod: CPTII,S$GLB,, | Performed by: OBSTETRICS & GYNECOLOGY

## 2019-02-27 PROCEDURE — 99999 PR PBB SHADOW E&M-EST. PATIENT-LVL III: ICD-10-PCS | Mod: PBBFAC,,, | Performed by: OBSTETRICS & GYNECOLOGY

## 2019-02-27 PROCEDURE — 87480 CANDIDA DNA DIR PROBE: CPT

## 2019-02-27 PROCEDURE — 3008F PR BODY MASS INDEX (BMI) DOCUMENTED: ICD-10-PCS | Mod: CPTII,S$GLB,, | Performed by: OBSTETRICS & GYNECOLOGY

## 2019-02-27 PROCEDURE — 99999 PR PBB SHADOW E&M-EST. PATIENT-LVL III: CPT | Mod: PBBFAC,,, | Performed by: OBSTETRICS & GYNECOLOGY

## 2019-02-27 RX ORDER — ESTRADIOL 0.1 MG/G
1 CREAM VAGINAL
Qty: 42.5 G | Refills: 1 | Status: SHIPPED | OUTPATIENT
Start: 2019-02-28 | End: 2020-02-10

## 2019-02-27 RX ORDER — SERTRALINE HYDROCHLORIDE 50 MG/1
50 TABLET, FILM COATED ORAL DAILY
COMMUNITY
End: 2022-03-14

## 2019-02-27 NOTE — PROGRESS NOTES
CC:vag dryness  Chief Complaint   Patient presents with    Vaginal Discharge       HPI:    38 y.o.   OB History      Para Term  AB Living    3 3 3     3    SAB TAB Ectopic Multiple Live Births          0 3        Complaining of: elevated PTH, fatigue, vag dryness, occ dc but BA supp helps  Cycles reg, light last 4 days, much improved with ocp's  Started zoloft 2 wks ago  Has microcytic anemia and has stared Iron bid  Seeing endocrine Raquel Coronado      (Not in a hospital admission)    Review of patient's allergies indicates:   Allergen Reactions    Strawberries [strawberry] Anaphylaxis    Penicillins Rash        Past Medical History:   Diagnosis Date    Tachycardia      Past Surgical History:   Procedure Laterality Date     SECTION      x2    DELIVERY- SECTION WITH TUBAL N/A 2017    Performed by Jose Elias Wei MD at [facility] L&D    fatty tumor removal      TONSILLECTOMY      TUBAL LIGATION       Family History   Problem Relation Age of Onset    Heart disease Paternal Grandmother     Cancer Maternal Grandmother     Uterine cancer Maternal Grandmother     Hypertension Father     Hypertension Mother      Social History     Tobacco Use    Smoking status: Never Smoker    Smokeless tobacco: Never Used   Substance Use Topics    Alcohol use: Yes     Comment: rarely    Drug use: No     ROS:  GENERAL: Feeling well overall. Denies fever or chills.   SKIN: Denies rash or lesions.   HEAD: Denies head injury or headache.   NODES: Denies enlarged lymph nodes.   CHEST: Denies chest pain or shortness of breath.   CARDIOVASCULAR: Denies palpitations or left sided chest pain.    ABDOMEN: Denies diarrhea, nausea, vomiting or rectal bleeding.   URINARY: No dysuria, hematuria, or burning on urination.  REPRODUCTIVE: See HPI.   BREASTS: Denies pain, lumps, or nipple discharge.   HEMATOLOGIC: No easy bruisability or excessive bleeding.   MUSCULOSKELETAL: Denies joint pain or swelling.    NEUROLOGIC: Denies syncope or weakness.   PSYCHIATRIC: Denies depression, anxiety or mood swings.      PE: /78   Wt 56.4 kg (124 lb 6.4 oz)   LMP 02/20/2019   BMI 22.04 kg/m²      APPEARANCE: Well nourished, well developed, in no acute distress.  SKIN: Normal skin turgor, no lesions.  NECK: Neck symmetric without masses or thyromegaly.  NODES: No inguinal, cervical, axillary or femoral lymph node enlargement.  CARDIOVASCULAR: Normal S1, S2. No rubs, murmurs or gallops.  NEUROLOGIC: Normal mood and affect. No depression or anxiety.   ABDOMEN: Soft. No tenderness or masses. No hepatosplenomegaly. No hernias.  RESPIRATORY: Normal respiratory effort with no retractions or use of accessory muscles.  BREASTS: Symmetrical, no skin changes or visible lesions. No palpable masses, nipple discharge, or adenopathy bilaterally.   BLADDER: Non-tender  GENITALIA: nml, slight erythema and atrophy, nt  URETHRA: normal urethra, normal urethral meatus  VAGINA: vaginal discharge scant and white  CERVIX: no lesions or cervical motion tenderness  UTERUS: normal  ADNEXA: normal adnexa    ASSESSMENT/ PLAN    Evie was seen today for vaginal discharge.    Diagnoses and all orders for this visit:    Microcytic anemia  -     Iron and TIBC; Future  -     Ferritin; Future  -     Methylmalonic acid, serum; Future    Vaginal discharge  -     Vaginosis Screen by DNA Probe    Atrophic vaginitis    Hormone replacement therapy (HRT)    discussed improving diet, cont Fe bis  Wean down on dose of adderal  Cont ocp's and zoloft  Biweekly estrace cream for vag sx's  F/u with endocrine        Jose Elias Wei MD

## 2019-02-28 ENCOUNTER — TELEPHONE (OUTPATIENT)
Dept: OBSTETRICS AND GYNECOLOGY | Facility: CLINIC | Age: 39
End: 2019-02-28

## 2019-02-28 ENCOUNTER — LAB VISIT (OUTPATIENT)
Dept: LAB | Facility: HOSPITAL | Age: 39
End: 2019-02-28
Attending: OBSTETRICS & GYNECOLOGY
Payer: COMMERCIAL

## 2019-02-28 DIAGNOSIS — D50.9 MICROCYTIC ANEMIA: ICD-10-CM

## 2019-02-28 LAB
FERRITIN SERPL-MCNC: 5 NG/ML
IRON SERPL-MCNC: 14 UG/DL
SATURATED IRON: 2 %
TOTAL IRON BINDING CAPACITY: 562 UG/DL
TRANSFERRIN SERPL-MCNC: 380 MG/DL

## 2019-02-28 PROCEDURE — 82728 ASSAY OF FERRITIN: CPT

## 2019-02-28 PROCEDURE — 83540 ASSAY OF IRON: CPT | Mod: PO

## 2019-02-28 PROCEDURE — 83921 ORGANIC ACID SINGLE QUANT: CPT | Mod: PO

## 2019-02-28 PROCEDURE — 36415 COLL VENOUS BLD VENIPUNCTURE: CPT | Mod: PO

## 2019-03-01 ENCOUNTER — TELEPHONE (OUTPATIENT)
Dept: OBSTETRICS AND GYNECOLOGY | Facility: CLINIC | Age: 39
End: 2019-03-01

## 2019-03-01 NOTE — TELEPHONE ENCOUNTER
----- Message from Radha Mandujano sent at 3/1/2019  8:38 AM CST -----  Contact: self/[phone]  Patient is returning your call.  Please advise

## 2019-03-05 LAB — METHYLMALONATE SERPL-SCNC: 0.28 UMOL/L

## 2019-03-11 ENCOUNTER — PATIENT MESSAGE (OUTPATIENT)
Dept: OBSTETRICS AND GYNECOLOGY | Facility: CLINIC | Age: 39
End: 2019-03-11

## 2019-03-11 ENCOUNTER — TELEPHONE (OUTPATIENT)
Dept: OBSTETRICS AND GYNECOLOGY | Facility: CLINIC | Age: 39
End: 2019-03-11

## 2019-03-11 RX ORDER — FLUCONAZOLE 150 MG/1
150 TABLET ORAL DAILY
Qty: 2 TABLET | Refills: 1 | Status: SHIPPED | OUTPATIENT
Start: 2019-03-11 | End: 2019-03-12

## 2019-03-11 NOTE — TELEPHONE ENCOUNTER
Notify pt of yeast  Rx sent to pharmacy  Needs to stay on Boric acid supp to prevent problem  Take as directed

## 2019-03-11 NOTE — TELEPHONE ENCOUNTER
Candida species group includes: Candida albicans, Candida tropicalis,   Candida parapsiolosis, Lora dubliniensis     These are the possibilities  Stay on boric acid

## 2019-03-13 ENCOUNTER — PATIENT MESSAGE (OUTPATIENT)
Dept: OBSTETRICS AND GYNECOLOGY | Facility: CLINIC | Age: 39
End: 2019-03-13

## 2019-03-13 ENCOUNTER — TELEPHONE (OUTPATIENT)
Dept: OBSTETRICS AND GYNECOLOGY | Facility: CLINIC | Age: 39
End: 2019-03-13

## 2019-03-13 NOTE — TELEPHONE ENCOUNTER
Pt states having bacterial vaginosis symptoms. Called in Flagyl 500 mg disp 14 take 1 PO bid, no refills per protocol.

## 2019-03-26 ENCOUNTER — HOSPITAL ENCOUNTER (OUTPATIENT)
Dept: RADIOLOGY | Facility: HOSPITAL | Age: 39
Discharge: HOME | End: 2019-03-26
Attending: OBSTETRICS & GYNECOLOGY
Payer: COMMERCIAL

## 2019-03-26 DIAGNOSIS — N63.10 BREAST MASS, RIGHT: ICD-10-CM

## 2019-03-26 DIAGNOSIS — N60.19 FIBROCYSTIC BREAST CHANGES, UNSPECIFIED LATERALITY: ICD-10-CM

## 2019-03-26 PROCEDURE — 77066 DX MAMMO INCL CAD BI: CPT | Mod: TC,PO

## 2019-03-26 PROCEDURE — 76642 ULTRASOUND BREAST LIMITED: CPT | Mod: TC,PO,RT

## 2019-05-30 LAB
BACTERIAL VAGINOSIS DNA: NEGATIVE
CANDIDA GLABRATA DNA: NEGATIVE
CANDIDA KRUSEI DNA: NEGATIVE
CANDIDA RRNA VAG QL PROBE: POSITIVE
T VAGINALIS RRNA GENITAL QL PROBE: NEGATIVE

## 2019-06-25 ENCOUNTER — PATIENT MESSAGE (OUTPATIENT)
Dept: OBSTETRICS AND GYNECOLOGY | Facility: CLINIC | Age: 39
End: 2019-06-25

## 2019-06-26 ENCOUNTER — OFFICE VISIT (OUTPATIENT)
Dept: OBSTETRICS AND GYNECOLOGY | Facility: CLINIC | Age: 39
End: 2019-06-26
Payer: COMMERCIAL

## 2019-06-26 VITALS
BODY MASS INDEX: 23.52 KG/M2 | DIASTOLIC BLOOD PRESSURE: 82 MMHG | WEIGHT: 132.81 LBS | SYSTOLIC BLOOD PRESSURE: 118 MMHG

## 2019-06-26 DIAGNOSIS — N89.8 VAGINAL DISCHARGE: Primary | ICD-10-CM

## 2019-06-26 DIAGNOSIS — B37.31 YEAST VAGINITIS: ICD-10-CM

## 2019-06-26 PROCEDURE — 3008F PR BODY MASS INDEX (BMI) DOCUMENTED: ICD-10-PCS | Mod: CPTII,S$GLB,, | Performed by: OBSTETRICS & GYNECOLOGY

## 2019-06-26 PROCEDURE — 99213 PR OFFICE/OUTPT VISIT, EST, LEVL III, 20-29 MIN: ICD-10-PCS | Mod: S$GLB,,, | Performed by: OBSTETRICS & GYNECOLOGY

## 2019-06-26 PROCEDURE — 99213 OFFICE O/P EST LOW 20 MIN: CPT | Mod: S$GLB,,, | Performed by: OBSTETRICS & GYNECOLOGY

## 2019-06-26 PROCEDURE — 87510 GARDNER VAG DNA DIR PROBE: CPT

## 2019-06-26 PROCEDURE — 99999 PR PBB SHADOW E&M-EST. PATIENT-LVL III: CPT | Mod: PBBFAC,,, | Performed by: OBSTETRICS & GYNECOLOGY

## 2019-06-26 PROCEDURE — 87480 CANDIDA DNA DIR PROBE: CPT

## 2019-06-26 PROCEDURE — 3008F BODY MASS INDEX DOCD: CPT | Mod: CPTII,S$GLB,, | Performed by: OBSTETRICS & GYNECOLOGY

## 2019-06-26 PROCEDURE — 87491 CHLMYD TRACH DNA AMP PROBE: CPT

## 2019-06-26 PROCEDURE — 99999 PR PBB SHADOW E&M-EST. PATIENT-LVL III: ICD-10-PCS | Mod: PBBFAC,,, | Performed by: OBSTETRICS & GYNECOLOGY

## 2019-06-26 RX ORDER — NORETHINDRONE ACETATE AND ETHINYL ESTRADIOL AND FERROUS FUMARATE 1MG-20(24)
1 KIT ORAL DAILY
Qty: 120 TABLET | Refills: 3 | Status: SHIPPED | OUTPATIENT
Start: 2019-06-26 | End: 2020-02-10 | Stop reason: SDUPTHER

## 2019-06-26 RX ORDER — TERCONAZOLE 8 MG/G
1 CREAM VAGINAL NIGHTLY
Qty: 20 G | Refills: 0 | Status: SHIPPED | OUTPATIENT
Start: 2019-06-26 | End: 2019-10-25 | Stop reason: SDUPTHER

## 2019-06-26 RX ORDER — FLUCONAZOLE 150 MG/1
150 TABLET ORAL DAILY
Qty: 1 TABLET | Refills: 1 | Status: SHIPPED | OUTPATIENT
Start: 2019-06-26 | End: 2019-06-27

## 2019-06-26 NOTE — PROGRESS NOTES
CC: Vaginal Discharge-recurrentyeast    HPI:   38 y.o. female  complains of white, thick and vaginal erythema noted vaginal discharge for after periods repetetively , uses diflucan but never has tried terazol and diflucan doesn't work well for problem. Patient's last menstrual period was 06/10/2019..  She describes her periods as regular. She is barely sexually active.  She uses oral contraceptives (estrogen/progesterone) for contraception.    ROS:  GENERAL: No fever, chills, fatigability or weight loss.  VULVAR: No pain, no lesions and no itching.  VAGINAL: No relaxation, no itching, no discharge, no abnormal bleeding and no lesions.  ABDOMEN: No abdominal pain. Denies nausea. Denies vomiting. No diarrhea. No constipation  BREAST: Denies pain. No lumps. No discharge.  URINARY: No incontinence, no nocturia, no frequency and no dysuria.  CARDIOVASCULAR: No chest pain. No shortness of breath. No leg cramps.  NEUROLOGICAL: No headaches. No vision changes.        Vitals:    19 0805   BP: 118/82         OBJECTIVE:   She appears well, afebrile.  Abdomen: benign, soft, nontender, no masses.  VULVA: Normal external female genitalia, normal urethra, normal urethral meatus  VAGINA:discharge: scant, white and thick  CERVIXNormal  UTERUSnormal  ADNEXAnormal adnexa        ASSESSMENT:   rule out GC or chlamydia and Recurrent  C. albicans vulvovaginitis    PLAN:   Orders Placed This Encounter    C. trachomatis/N. gonorrhoeae by AMP DNA [facility]; Vagina    Vaginosis Screen by DNA Probe    terconazole (TERAZOL 3) 0.8 % vaginal cream    fluconazole (DIFLUCAN) 150 MG Tab    norethindrone-e.estradiol-iron (MIBELAS 24 FE) 1 mg-20 mcg(24) /75 mg (4) Chew     Discussed eliminating menstrual cycle with cont ocp's to eliminate period as a trigger for infxn  prob has diflucan  resistant yeast so will treay with terazol  Hopefully eliminating cycles helpsm with repetitive nature of infxn's  BA supp and probitics  discussed  Retest Mon to dtermine resolution of current infxn

## 2019-06-27 LAB
BACTERIAL VAGINOSIS DNA: NEGATIVE
C TRACH DNA SPEC QL NAA+PROBE: NOT DETECTED
CANDIDA GLABRATA DNA: NEGATIVE
CANDIDA KRUSEI DNA: NEGATIVE
CANDIDA RRNA VAG QL PROBE: POSITIVE
N GONORRHOEA DNA SPEC QL NAA+PROBE: NOT DETECTED
T VAGINALIS RRNA GENITAL QL PROBE: NEGATIVE

## 2019-07-01 ENCOUNTER — OFFICE VISIT (OUTPATIENT)
Dept: OBSTETRICS AND GYNECOLOGY | Facility: CLINIC | Age: 39
End: 2019-07-01
Payer: COMMERCIAL

## 2019-07-01 VITALS — SYSTOLIC BLOOD PRESSURE: 118 MMHG | DIASTOLIC BLOOD PRESSURE: 84 MMHG | WEIGHT: 134 LBS | BODY MASS INDEX: 23.74 KG/M2

## 2019-07-01 DIAGNOSIS — N89.8 VAGINAL DISCHARGE: Primary | ICD-10-CM

## 2019-07-01 PROCEDURE — 99999 PR PBB SHADOW E&M-EST. PATIENT-LVL III: CPT | Mod: PBBFAC,,, | Performed by: OBSTETRICS & GYNECOLOGY

## 2019-07-01 PROCEDURE — 3008F BODY MASS INDEX DOCD: CPT | Mod: CPTII,S$GLB,, | Performed by: OBSTETRICS & GYNECOLOGY

## 2019-07-01 PROCEDURE — 99213 PR OFFICE/OUTPT VISIT, EST, LEVL III, 20-29 MIN: ICD-10-PCS | Mod: S$GLB,,, | Performed by: OBSTETRICS & GYNECOLOGY

## 2019-07-01 PROCEDURE — 3008F PR BODY MASS INDEX (BMI) DOCUMENTED: ICD-10-PCS | Mod: CPTII,S$GLB,, | Performed by: OBSTETRICS & GYNECOLOGY

## 2019-07-01 PROCEDURE — 99213 OFFICE O/P EST LOW 20 MIN: CPT | Mod: S$GLB,,, | Performed by: OBSTETRICS & GYNECOLOGY

## 2019-07-01 PROCEDURE — 87480 CANDIDA DNA DIR PROBE: CPT

## 2019-07-01 PROCEDURE — 99999 PR PBB SHADOW E&M-EST. PATIENT-LVL III: ICD-10-PCS | Mod: PBBFAC,,, | Performed by: OBSTETRICS & GYNECOLOGY

## 2019-07-01 PROCEDURE — 87510 GARDNER VAG DNA DIR PROBE: CPT

## 2019-07-01 NOTE — PROGRESS NOTES
CC:  Chief Complaint   Patient presents with    Follow-up       HPI:    38 y.o. OB History        3    Para   3    Term   3            AB        Living   3       SAB        TAB        Ectopic        Multiple   0    Live Births   3               Did well with terAzol, sxs resolved, hasn't started BA supp      (Not in a hospital admission)    Review of patient's allergies indicates:   Allergen Reactions    Strawberries [strawberry] Anaphylaxis    Penicillins Rash        Past Medical History:   Diagnosis Date    Tachycardia      Past Surgical History:   Procedure Laterality Date     SECTION      x2    DELIVERY- SECTION WITH TUBAL N/A 2017    Performed by [facility] at [facility] L[facility]D    fatty tumor removal      TONSILLECTOMY      TUBAL LIGATION       Family History   Problem Relation Age of Onset    Heart disease Paternal Grandmother     Cancer Maternal Grandmother     Uterine cancer Maternal Grandmother     Hypertension Father     Hypertension Mother      Social History     Tobacco Use    Smoking status: Never Smoker    Smokeless tobacco: Never Used   Substance Use Topics    Alcohol use: Yes     Comment: rarely    Drug use: No     ROS:  GENERAL: Feeling well overall. Denies fever or chills.   SKIN: Denies rash or lesions.   HEAD: Denies head injury or headache.   NODES: Denies enlarged lymph nodes.   CHEST: Denies chest pain or shortness of breath.   CARDIOVASCULAR: Denies palpitations or left sided chest pain.    ABDOMEN: Denies diarrhea, nausea, vomiting or rectal bleeding.   URINARY: No dysuria, hematuria, or burning on urination.  REPRODUCTIVE: See HPI.   BREASTS: Denies pain, lumps, or nipple discharge.   HEMATOLOGIC: No easy bruisability or excessive bleeding.   MUSCULOSKELETAL: Denies joint pain or swelling.   NEUROLOGIC: Denies syncope or weakness.   PSYCHIATRIC: Denies depression, anxiety or mood swings.      PE: /84   Wt 60.8 kg (134 lb)   LMP  06/10/2019   BMI 23.74 kg/m²      APPEARANCE: Well nourished, well developed, in no acute distress.  SKIN: Normal skin turgor, no lesions.  NECK: Neck symmetric without masses or thyromegaly.  NODES: No inguinal, cervical, axillary or femoral lymph node enlargement.  CARDIOVASCULAR: Normal S1, S2. No rubs, murmurs or gallops.  NEUROLOGIC: Normal mood and affect. No depression or anxiety.   ABDOMEN: Soft. No tenderness or masses. No hepatosplenomegaly. No hernias.  RESPIRATORY: Normal respiratory effort with no retractions or use of accessory muscles.  BREASTS: Symmetrical, no skin changes or visible lesions. No palpable masses, nipple discharge, or adenopathy bilaterally.   BLADDER: Non-tender  GENITALIA: normal external genitalia, no erythema, no discharge  URETHRA: normal urethra, normal urethral meatus  VAGINA: Normal  CERVIX: no lesions or cervical motion tenderness  UTERUS: normal  ADNEXA: normal adnexa    ASSESSMENT/ PLAN    Evie was seen today for follow-up.    Diagnoses and all orders for this visit:    Vaginal discharge  -     Vaginosis Screen by DNA Probe      Neema BA supp, monitor for sx recurrence  rtc for pap      Jose Elias Wei MD

## 2019-07-02 ENCOUNTER — TELEPHONE (OUTPATIENT)
Dept: OBSTETRICS AND GYNECOLOGY | Facility: CLINIC | Age: 39
End: 2019-07-02

## 2019-07-02 LAB
BACTERIAL VAGINOSIS DNA: POSITIVE
CANDIDA GLABRATA DNA: NEGATIVE
CANDIDA KRUSEI DNA: NEGATIVE
CANDIDA RRNA VAG QL PROBE: NEGATIVE
T VAGINALIS RRNA GENITAL QL PROBE: NEGATIVE

## 2019-07-02 RX ORDER — METRONIDAZOLE 7.5 MG/G
1 GEL VAGINAL DAILY
Qty: 5 G | Refills: 0 | Status: SHIPPED | OUTPATIENT
Start: 2019-07-02 | End: 2019-07-07

## 2019-07-02 NOTE — TELEPHONE ENCOUNTER
Called patient, informed patient of BV. Advised patient yeast has resolved and Rx for the BV was sent to pharmacy to take as directed. I also informed patient we will recheck in 1-2 weeks when she comes back for her pap. Informed patient of appointment scheduled for 7/15/19. Patient verbalized understanding. Informed patient to start boric acid after metrogel is complete and to continue probiotics. Patient verbalized understanding.

## 2019-07-02 NOTE — TELEPHONE ENCOUNTER
Notify pt of BV, yeast is resolved  Rx sent to pharmacy for BV  Take as directed  We will recheck it in 1-2 wks when you come back for your pap  Start Boric acid after metrogel completed and continue Probiotics

## 2019-07-15 ENCOUNTER — PATIENT MESSAGE (OUTPATIENT)
Dept: OBSTETRICS AND GYNECOLOGY | Facility: CLINIC | Age: 39
End: 2019-07-15

## 2019-10-25 RX ORDER — TERCONAZOLE 8 MG/G
CREAM VAGINAL
Qty: 20 G | Refills: 0 | Status: SHIPPED | OUTPATIENT
Start: 2019-10-25 | End: 2020-02-10

## 2020-02-03 ENCOUNTER — PATIENT MESSAGE (OUTPATIENT)
Dept: OBSTETRICS AND GYNECOLOGY | Facility: CLINIC | Age: 40
End: 2020-02-03

## 2020-02-10 ENCOUNTER — OFFICE VISIT (OUTPATIENT)
Dept: OBSTETRICS AND GYNECOLOGY | Facility: CLINIC | Age: 40
End: 2020-02-10
Payer: COMMERCIAL

## 2020-02-10 VITALS
BODY MASS INDEX: 26.15 KG/M2 | WEIGHT: 147.63 LBS | DIASTOLIC BLOOD PRESSURE: 80 MMHG | SYSTOLIC BLOOD PRESSURE: 140 MMHG

## 2020-02-10 DIAGNOSIS — Z30.41 ENCOUNTER FOR SURVEILLANCE OF CONTRACEPTIVE PILLS: ICD-10-CM

## 2020-02-10 DIAGNOSIS — Z01.419 WELL WOMAN EXAM WITH ROUTINE GYNECOLOGICAL EXAM: Primary | ICD-10-CM

## 2020-02-10 DIAGNOSIS — N89.8 VAGINAL DISCHARGE: ICD-10-CM

## 2020-02-10 PROCEDURE — 99395 PR PREVENTIVE VISIT,EST,18-39: ICD-10-PCS | Mod: S$GLB,,, | Performed by: OBSTETRICS & GYNECOLOGY

## 2020-02-10 PROCEDURE — 87481 CANDIDA DNA AMP PROBE: CPT | Mod: 59

## 2020-02-10 PROCEDURE — 99999 PR PBB SHADOW E&M-EST. PATIENT-LVL II: CPT | Mod: PBBFAC,,, | Performed by: OBSTETRICS & GYNECOLOGY

## 2020-02-10 PROCEDURE — 87491 CHLMYD TRACH DNA AMP PROBE: CPT

## 2020-02-10 PROCEDURE — 99999 PR PBB SHADOW E&M-EST. PATIENT-LVL II: ICD-10-PCS | Mod: PBBFAC,,, | Performed by: OBSTETRICS & GYNECOLOGY

## 2020-02-10 PROCEDURE — 88175 CYTOPATH C/V AUTO FLUID REDO: CPT

## 2020-02-10 PROCEDURE — 99395 PREV VISIT EST AGE 18-39: CPT | Mod: S$GLB,,, | Performed by: OBSTETRICS & GYNECOLOGY

## 2020-02-10 RX ORDER — NORETHINDRONE ACETATE AND ETHINYL ESTRADIOL AND FERROUS FUMARATE 1MG-20(24)
1 KIT ORAL DAILY
Qty: 120 TABLET | Refills: 3 | Status: SHIPPED | OUTPATIENT
Start: 2020-02-10 | End: 2022-03-14

## 2020-02-10 NOTE — PROGRESS NOTES
CC: Annual check-up    SUBJECTIVE:   39 y.o. female   for annual routine Pap and checkup. Patient's last menstrual period was 2020..  She has no unusual complaints and scant d/c.        Past Medical History:   Diagnosis Date    Tachycardia      Past Surgical History:   Procedure Laterality Date     SECTION      x2    fatty tumor removal      TONSILLECTOMY      TUBAL LIGATION       Social History     Socioeconomic History    Marital status:      Spouse name: Not on file    Number of children: Not on file    Years of education: Not on file    Highest education level: Not on file   Occupational History    Not on file   Social Needs    Financial resource strain: Not on file    Food insecurity:     Worry: Not on file     Inability: Not on file    Transportation needs:     Medical: Not on file     Non-medical: Not on file   Tobacco Use    Smoking status: Never Smoker    Smokeless tobacco: Never Used   Substance and Sexual Activity    Alcohol use: Yes     Comment: rarely    Drug use: No    Sexual activity: Yes     Partners: Male     Birth control/protection: See Surgical Hx   Lifestyle    Physical activity:     Days per week: Not on file     Minutes per session: Not on file    Stress: Not on file   Relationships    Social connections:     Talks on phone: Not on file     Gets together: Not on file     Attends [employer] service: Not on file     Active member of club or organization: Not on file     Attends meetings of clubs or organizations: Not on file     Relationship status: Not on file   Other Topics Concern    Not on file   Social History Narrative    Not on file     Family History   Problem Relation Age of Onset    Heart disease Paternal Grandmother     Cancer Maternal Grandmother     Uterine cancer Maternal Grandmother     Hypertension Father     Hypertension Mother      OB History    Para Term  AB Living   3 3 3     3   SAB TAB Ectopic Multiple  Live Births         0 3      # Outcome Date GA Lbr Ford/2nd Weight Sex Delivery Anes PTL Lv   3 Term 08/08/17 39w1d  3.515 kg (7 lb 12 oz) M CS-LTranv Spinal N TANGELA   2 Term 2013 40w0d  3.345 kg (7 lb 6 oz) M CS-LTranv  N TANGELA   1 Term 2008 37w0d  2.58 kg (5 lb 11 oz) M CS-LTranv   TANGELA      Complications: Failure to Progress in First Stage         Current Outpatient Medications   Medication Sig Dispense Refill    dextroamphetamine-amphetamine (ADDERALL) 15 mg tablet Take 15 mg by mouth once daily.       norethindrone-e.estradiol-iron (MIBELAS 24 FE) 1 mg-20 mcg(24) /75 mg (4) Chew Take 1 tablet by mouth once daily. 120 tablet 3    sertraline (ZOLOFT) 50 MG tablet Take 50 mg by mouth once daily.      zolpidem (AMBIEN) 5 MG Tab Take 5 mg by mouth every evening.        No current facility-administered medications for this visit.      Allergies: Strawberries [strawberry] and Penicillins     ROS:  Constitutional: no weight loss, weight gain, fever, fatigue  Eyes:  No vision changes, glasses/contacts  ENT/Mouth: No ulcers, sinus problems, ears ringing, headache  Cardiovascular: No inability to lie flat, chest pain, exercise intolerance, swelling, heart palpitations  Respiratory: No wheezing, coughing blood, shortness of breath, or cough  Gastrointestinal: No diarrhea, bloody stool, nausea/vomiting, constipation, gas, hemorrhoids  Genitourinary: No blood in urine, painful urination, urgency of urination, frequency of urination, incomplete emptying, incontinence, abnormal bleeding, painful periods, heavy periods, vaginal discharge, vaginal odor, painful intercourse, sexual problems, bleeding after intercourse.  Musculoskeletal: No muscle weakness  Skin/Breast: No painful breasts, nipple discharge, masses, rash, ulcers  Neurological: No passing out, seizures, numbness, headache  Endocrine: No diabetes, hypothyroid, hyperthyroid, hot flashes, hair loss, abnormal hair growth, ance  Psychiatric: No depression,  crying  Hematologic: No bruises, bleeding, swollen lymph nodes, anemia.      OBJECTIVE:   The patient appears well, alert, oriented x 3, in no distress.  BP (!) 140/80   Wt 67 kg (147 lb 9.6 oz)   LMP 01/22/2020   BMI 26.15 kg/m²   NECK: no thyromegaly, trachea midline  SKIN: no acne, striae, hirsutism  BREAST EXAM: breasts appear normal, no suspicious masses, no skin or nipple changes or axillary nodes, symmetric fibrous changes in both upper outer quadrants, bilateral implants, no palpable abnormalities otherwise  ABDOMEN: no hernias, masses, or hepatosplenomegaly  GENITALIA: normal external genitalia, no erythema, no discharge  URETHRA: normal urethra, normal urethral meatus  VAGINA: vaginal discharge scant, white and thick  CERVIX: no lesions or cervical motion tenderness  UTERUS: normal  ADNEXA: normal adnexa      ASSESSMENT:   well woman  no contraindication to continue use of oral contraceptives  1. Well woman exam with routine gynecological exam    2. Vaginal discharge    3. Encounter for surveillance of contraceptive pills        PLAN:   pap smear  return annually or prn  Orders Placed This Encounter    C. trachomatis/N. gonorrhoeae by AMP DNA [facility]; Cervicovaginal    Vaginosis Screen by DNA Probe    Liquid-Based Pap Smear, Screening    norethindrone-e.estradiol-iron (MIBELAS 24 FE) 1 mg-20 mcg(24) /75 mg (4) Chew

## 2020-02-12 ENCOUNTER — TELEPHONE (OUTPATIENT)
Dept: OBSTETRICS AND GYNECOLOGY | Facility: CLINIC | Age: 40
End: 2020-02-12

## 2020-02-12 ENCOUNTER — PATIENT MESSAGE (OUTPATIENT)
Dept: OBSTETRICS AND GYNECOLOGY | Facility: CLINIC | Age: 40
End: 2020-02-12

## 2020-02-12 RX ORDER — TERCONAZOLE 8 MG/G
1 CREAM VAGINAL NIGHTLY
Qty: 20 G | Refills: 0 | Status: SHIPPED | OUTPATIENT
Start: 2020-02-12 | End: 2020-02-15

## 2020-02-27 ENCOUNTER — PATIENT MESSAGE (OUTPATIENT)
Dept: OBSTETRICS AND GYNECOLOGY | Facility: CLINIC | Age: 40
End: 2020-02-27

## 2020-03-05 ENCOUNTER — PATIENT MESSAGE (OUTPATIENT)
Dept: OBSTETRICS AND GYNECOLOGY | Facility: CLINIC | Age: 40
End: 2020-03-05

## 2020-03-09 LAB
FINAL PATHOLOGIC DIAGNOSIS: NORMAL
Lab: NORMAL

## 2020-03-26 ENCOUNTER — PATIENT MESSAGE (OUTPATIENT)
Dept: OBSTETRICS AND GYNECOLOGY | Facility: CLINIC | Age: 40
End: 2020-03-26

## 2020-03-30 ENCOUNTER — PATIENT MESSAGE (OUTPATIENT)
Dept: OBSTETRICS AND GYNECOLOGY | Facility: CLINIC | Age: 40
End: 2020-03-30

## 2020-08-24 ENCOUNTER — PATIENT MESSAGE (OUTPATIENT)
Dept: OBSTETRICS AND GYNECOLOGY | Facility: CLINIC | Age: 40
End: 2020-08-24

## 2020-08-25 NOTE — TELEPHONE ENCOUNTER
Pt states yeast symptoms. Called in Terazol 3 disp 20 g inset one applicator vaginal every evening for 3 nights, no refills per protocol.

## 2021-03-09 ENCOUNTER — PATIENT MESSAGE (OUTPATIENT)
Dept: OBSTETRICS AND GYNECOLOGY | Facility: CLINIC | Age: 41
End: 2021-03-09

## 2021-03-10 ENCOUNTER — OFFICE VISIT (OUTPATIENT)
Dept: OBSTETRICS AND GYNECOLOGY | Facility: CLINIC | Age: 41
End: 2021-03-10
Payer: COMMERCIAL

## 2021-03-10 VITALS — SYSTOLIC BLOOD PRESSURE: 107 MMHG | WEIGHT: 157 LBS | DIASTOLIC BLOOD PRESSURE: 74 MMHG | BODY MASS INDEX: 27.81 KG/M2

## 2021-03-10 DIAGNOSIS — Z01.419 WELL WOMAN EXAM WITH ROUTINE GYNECOLOGICAL EXAM: Primary | ICD-10-CM

## 2021-03-10 DIAGNOSIS — N89.8 VAGINAL DISCHARGE: ICD-10-CM

## 2021-03-10 PROCEDURE — 1126F AMNT PAIN NOTED NONE PRSNT: CPT | Mod: S$GLB,,, | Performed by: OBSTETRICS & GYNECOLOGY

## 2021-03-10 PROCEDURE — 1126F PR PAIN SEVERITY QUANTIFIED, NO PAIN PRESENT: ICD-10-PCS | Mod: S$GLB,,, | Performed by: OBSTETRICS & GYNECOLOGY

## 2021-03-10 PROCEDURE — 87481 CANDIDA DNA AMP PROBE: CPT | Mod: 59 | Performed by: OBSTETRICS & GYNECOLOGY

## 2021-03-10 PROCEDURE — 99999 PR PBB SHADOW E&M-EST. PATIENT-LVL II: CPT | Mod: PBBFAC,,, | Performed by: OBSTETRICS & GYNECOLOGY

## 2021-03-10 PROCEDURE — 88175 CYTOPATH C/V AUTO FLUID REDO: CPT | Performed by: OBSTETRICS & GYNECOLOGY

## 2021-03-10 PROCEDURE — 99395 PREV VISIT EST AGE 18-39: CPT | Mod: S$GLB,,, | Performed by: OBSTETRICS & GYNECOLOGY

## 2021-03-10 PROCEDURE — 99395 PR PREVENTIVE VISIT,EST,18-39: ICD-10-PCS | Mod: S$GLB,,, | Performed by: OBSTETRICS & GYNECOLOGY

## 2021-03-10 PROCEDURE — 99999 PR PBB SHADOW E&M-EST. PATIENT-LVL II: ICD-10-PCS | Mod: PBBFAC,,, | Performed by: OBSTETRICS & GYNECOLOGY

## 2021-03-10 RX ORDER — TERCONAZOLE 8 MG/G
1 CREAM VAGINAL NIGHTLY
Qty: 20 G | Refills: 0 | Status: SHIPPED | OUTPATIENT
Start: 2021-03-10 | End: 2021-03-13

## 2021-03-10 RX ORDER — LOSARTAN POTASSIUM 25 MG/1
TABLET ORAL
COMMUNITY
End: 2022-03-14

## 2021-03-15 ENCOUNTER — PATIENT MESSAGE (OUTPATIENT)
Dept: OBSTETRICS AND GYNECOLOGY | Facility: CLINIC | Age: 41
End: 2021-03-15

## 2021-03-17 LAB
CLINICAL INFO: NORMAL
CYTO CVX: NORMAL
CYTOLOGIST CVX/VAG CYTO: NORMAL
CYTOLOGY CMNT CVX/VAG CYTO-IMP: NORMAL
CYTOLOGY PAP THIN PREP EXPLANATION: NORMAL
DATE OF PREVIOUS PAP: NORMAL
DATE PREVIOUS BX: NO
LMP START DATE: NORMAL
SPECIMEN SOURCE CVX/VAG CYTO: NORMAL
STAT OF ADQ CVX/VAG CYTO-IMP: NORMAL

## 2021-05-13 ENCOUNTER — PATIENT MESSAGE (OUTPATIENT)
Dept: OBSTETRICS AND GYNECOLOGY | Facility: CLINIC | Age: 41
End: 2021-05-13

## 2021-11-17 ENCOUNTER — PATIENT MESSAGE (OUTPATIENT)
Dept: OBSTETRICS AND GYNECOLOGY | Facility: CLINIC | Age: 41
End: 2021-11-17
Payer: COMMERCIAL

## 2021-11-17 RX ORDER — TERCONAZOLE 8 MG/G
1 CREAM VAGINAL NIGHTLY
Qty: 20 G | Refills: 0 | Status: SHIPPED | OUTPATIENT
Start: 2021-11-17 | End: 2022-01-12 | Stop reason: SDUPTHER

## 2021-11-30 ENCOUNTER — PATIENT MESSAGE (OUTPATIENT)
Dept: OBSTETRICS AND GYNECOLOGY | Facility: CLINIC | Age: 41
End: 2021-11-30
Payer: COMMERCIAL

## 2021-11-30 DIAGNOSIS — Z12.31 ENCOUNTER FOR SCREENING MAMMOGRAM FOR BREAST CANCER: Primary | ICD-10-CM

## 2022-02-04 ENCOUNTER — HOSPITAL ENCOUNTER (OUTPATIENT)
Dept: RADIOLOGY | Facility: HOSPITAL | Age: 42
Discharge: HOME | End: 2022-02-04
Attending: OBSTETRICS & GYNECOLOGY
Payer: COMMERCIAL

## 2022-02-04 DIAGNOSIS — Z12.31 ENCOUNTER FOR SCREENING MAMMOGRAM FOR BREAST CANCER: ICD-10-CM

## 2022-02-04 PROCEDURE — 77067 SCR MAMMO BI INCL CAD: CPT | Mod: TC,PO

## 2022-02-04 PROCEDURE — 77063 BREAST TOMOSYNTHESIS BI: CPT | Mod: TC,PO

## 2022-02-17 ENCOUNTER — OFFICE VISIT (OUTPATIENT)
Dept: OBSTETRICS AND GYNECOLOGY | Facility: CLINIC | Age: 42
End: 2022-02-17
Payer: COMMERCIAL

## 2022-02-17 VITALS
HEIGHT: 63 IN | SYSTOLIC BLOOD PRESSURE: 149 MMHG | WEIGHT: 148.81 LBS | DIASTOLIC BLOOD PRESSURE: 90 MMHG | BODY MASS INDEX: 26.37 KG/M2

## 2022-02-17 DIAGNOSIS — N92.6 IRREGULAR MENSTRUAL BLEEDING: Primary | ICD-10-CM

## 2022-02-17 DIAGNOSIS — N92.0 MENORRHAGIA WITH REGULAR CYCLE: ICD-10-CM

## 2022-02-17 PROCEDURE — 3077F SYST BP >= 140 MM HG: CPT | Mod: CPTII,S$GLB,, | Performed by: OBSTETRICS & GYNECOLOGY

## 2022-02-17 PROCEDURE — 3008F PR BODY MASS INDEX (BMI) DOCUMENTED: ICD-10-PCS | Mod: CPTII,S$GLB,, | Performed by: OBSTETRICS & GYNECOLOGY

## 2022-02-17 PROCEDURE — 3077F PR MOST RECENT SYSTOLIC BLOOD PRESSURE >= 140 MM HG: ICD-10-PCS | Mod: CPTII,S$GLB,, | Performed by: OBSTETRICS & GYNECOLOGY

## 2022-02-17 PROCEDURE — 99214 PR OFFICE/OUTPT VISIT, EST, LEVL IV, 30-39 MIN: ICD-10-PCS | Mod: 57,S$GLB,, | Performed by: OBSTETRICS & GYNECOLOGY

## 2022-02-17 PROCEDURE — 1160F RVW MEDS BY RX/DR IN RCRD: CPT | Mod: CPTII,S$GLB,, | Performed by: OBSTETRICS & GYNECOLOGY

## 2022-02-17 PROCEDURE — 1160F PR REVIEW ALL MEDS BY PRESCRIBER/CLIN PHARMACIST DOCUMENTED: ICD-10-PCS | Mod: CPTII,S$GLB,, | Performed by: OBSTETRICS & GYNECOLOGY

## 2022-02-17 PROCEDURE — 99214 OFFICE O/P EST MOD 30 MIN: CPT | Mod: 57,S$GLB,, | Performed by: OBSTETRICS & GYNECOLOGY

## 2022-02-17 PROCEDURE — 3080F DIAST BP >= 90 MM HG: CPT | Mod: CPTII,S$GLB,, | Performed by: OBSTETRICS & GYNECOLOGY

## 2022-02-17 PROCEDURE — 99999 PR PBB SHADOW E&M-EST. PATIENT-LVL III: CPT | Mod: PBBFAC,,, | Performed by: OBSTETRICS & GYNECOLOGY

## 2022-02-17 PROCEDURE — 3080F PR MOST RECENT DIASTOLIC BLOOD PRESSURE >= 90 MM HG: ICD-10-PCS | Mod: CPTII,S$GLB,, | Performed by: OBSTETRICS & GYNECOLOGY

## 2022-02-17 PROCEDURE — 99999 PR PBB SHADOW E&M-EST. PATIENT-LVL III: ICD-10-PCS | Mod: PBBFAC,,, | Performed by: OBSTETRICS & GYNECOLOGY

## 2022-02-17 PROCEDURE — 1159F PR MEDICATION LIST DOCUMENTED IN MEDICAL RECORD: ICD-10-PCS | Mod: CPTII,S$GLB,, | Performed by: OBSTETRICS & GYNECOLOGY

## 2022-02-17 PROCEDURE — 3008F BODY MASS INDEX DOCD: CPT | Mod: CPTII,S$GLB,, | Performed by: OBSTETRICS & GYNECOLOGY

## 2022-02-17 PROCEDURE — 1159F MED LIST DOCD IN RCRD: CPT | Mod: CPTII,S$GLB,, | Performed by: OBSTETRICS & GYNECOLOGY

## 2022-02-17 RX ORDER — ZOLPIDEM TARTRATE 10 MG/1
10 TABLET ORAL NIGHTLY PRN
COMMUNITY
Start: 2022-02-05

## 2022-02-17 NOTE — PROGRESS NOTES
CC:  Chief Complaint   Patient presents with    Consult       HPI:    41 y.o.   OB History        3    Para   3    Term   3            AB        Living   3       SAB        IAB        Ectopic        Multiple   0    Live Births   3               Complaining of: heavy cycles, bleeds uncontrollably for 2 days of every cycle q 25 days, changing supertampon hourly  Doesn't want to take hormones prefers doing an abaltion  Is still bleeding today and doesn't want an exam today  (Not in a hospital admission)      Review of patient's allergies indicates:   Allergen Reactions    Strawberries [strawberry] Anaphylaxis    Penicillins Rash        Past Medical History:   Diagnosis Date    Tachycardia      Past Surgical History:   Procedure Laterality Date     SECTION      x2    fatty tumor removal      TONSILLECTOMY      TUBAL LIGATION       Family History   Problem Relation Age of Onset    Heart disease Paternal Grandmother     Cancer Maternal Grandmother     Uterine cancer Maternal Grandmother     Hypertension Father     Hypertension Mother      Social History     Tobacco Use    Smoking status: Never Smoker    Smokeless tobacco: Never Used   Substance Use Topics    Alcohol use: Yes     Comment: rarely    Drug use: No     ROS:  GENERAL: Feeling well overall. Denies fever or chills.   SKIN: Denies rash or lesions.   HEAD: Denies head injury or headache.   NODES: Denies enlarged lymph nodes.   CHEST: Denies chest pain or shortness of breath.   CARDIOVASCULAR: Denies palpitations or left sided chest pain.    ABDOMEN: Denies diarrhea, nausea, vomiting or rectal bleeding.   URINARY: No dysuria, hematuria, or burning on urination.  REPRODUCTIVE: See HPI.   BREASTS: Denies pain, lumps, or nipple discharge.   HEMATOLOGIC: No easy bruisability or excessive bleeding.   MUSCULOSKELETAL: Denies joint pain or swelling.   NEUROLOGIC: Denies syncope or weakness.   PSYCHIATRIC: Denies depression, anxiety or  "mood swings.      PE: BP (!) 149/90   Ht 5' 3" (1.6 m)   Wt 67.5 kg (148 lb 12.8 oz)   LMP 02/14/2022   BMI 26.36 kg/m²      APPEARANCE: Well nourished, well developed, in no acute distress.  SKIN: Normal skin turgor, no lesions.  NECK: Neck symmetric without masses or thyromegaly.  NODES: No inguinal, cervical, axillary or femoral lymph node enlargement.  CARDIOVASCULAR: Normal S1, S2. No rubs, murmurs or gallops.  NEUROLOGIC: Normal mood and affect. No depression or anxiety.   ABDOMEN: Soft. No tenderness or masses. No hepatosplenomegaly. No hernias.  RESPIRATORY: Normal respiratory effort with no retractions or use of accessory muscles.  Lab Results   Component Value Date    WBC 8.01 08/09/2017    HGB 7.3 (L) 08/09/2017    HCT 24.6 (L) 08/09/2017    MCV 68 (L) 08/09/2017     (L) 08/09/2017     Has more updated labs from PCP  ASSESSMENT/ PLAN    Evie was seen today for consult.    Diagnoses and all orders for this visit:    Irregular menstrual bleeding  -     US Pelvis Comp with Transvag NON-OB (xpd; Future    Menorrhagia with regular cycle      Will f/u on labs from PCP rakesh, and u/s and schedul;e embx next week  Prefers ablation to be done on 3/17 or 4/7 at [facility] so doesn't have to miss work    Patient was counseled today on the causes of AUB/menorrhagia: fibroids, polyps, adenomyosis, anovulation resulting in endometrial hyperplasia, endometritis, cervicitis; the need for a proper evaluation and for a tissue diagnosis was discussed.  A hysteroscopy with D/C  may be necessary. The need for , cervical cultures and TVUS was discussed.   The hormonal treatment options for menorrhagia include: Prometrium,  BCPs + NSAID, IUD-Mirena, the pros, cons, risks, and benefits of each was discussed. Other options include endometrial ablation, uterine artery embolization, and hysterectomy; each was discussed in detail.   During the consultation session all of her question were answered. Patient elects to try D&C " hyst and Novasure ablation.  Jose Elias Wei MD

## 2022-02-18 ENCOUNTER — TELEPHONE (OUTPATIENT)
Dept: OBSTETRICS AND GYNECOLOGY | Facility: CLINIC | Age: 42
End: 2022-02-18
Payer: COMMERCIAL

## 2022-02-22 ENCOUNTER — HOSPITAL ENCOUNTER (OUTPATIENT)
Dept: RADIOLOGY | Facility: HOSPITAL | Age: 42
Discharge: HOME | End: 2022-02-22
Attending: OBSTETRICS & GYNECOLOGY
Payer: COMMERCIAL

## 2022-02-22 DIAGNOSIS — N92.6 IRREGULAR MENSTRUAL BLEEDING: ICD-10-CM

## 2022-02-22 PROCEDURE — 76830 TRANSVAGINAL US NON-OB: CPT | Mod: TC,PO

## 2022-02-23 ENCOUNTER — OFFICE VISIT (OUTPATIENT)
Dept: OBSTETRICS AND GYNECOLOGY | Facility: CLINIC | Age: 42
End: 2022-02-23
Payer: COMMERCIAL

## 2022-02-23 VITALS
SYSTOLIC BLOOD PRESSURE: 166 MMHG | DIASTOLIC BLOOD PRESSURE: 91 MMHG | WEIGHT: 147.38 LBS | BODY MASS INDEX: 26.11 KG/M2

## 2022-02-23 DIAGNOSIS — D25.0 FIBROIDS, SUBMUCOSAL: ICD-10-CM

## 2022-02-23 DIAGNOSIS — N88.2 CERVICAL STENOSIS (UTERINE CERVIX): ICD-10-CM

## 2022-02-23 DIAGNOSIS — N92.0 MENORRHAGIA WITH REGULAR CYCLE: Primary | ICD-10-CM

## 2022-02-23 PROCEDURE — 3008F BODY MASS INDEX DOCD: CPT | Mod: CPTII,S$GLB,, | Performed by: OBSTETRICS & GYNECOLOGY

## 2022-02-23 PROCEDURE — 3080F PR MOST RECENT DIASTOLIC BLOOD PRESSURE >= 90 MM HG: ICD-10-PCS | Mod: CPTII,S$GLB,, | Performed by: OBSTETRICS & GYNECOLOGY

## 2022-02-23 PROCEDURE — 58100 BIOPSY OF UTERUS LINING: CPT | Mod: 53,S$GLB,, | Performed by: OBSTETRICS & GYNECOLOGY

## 2022-02-23 PROCEDURE — 3077F SYST BP >= 140 MM HG: CPT | Mod: CPTII,S$GLB,, | Performed by: OBSTETRICS & GYNECOLOGY

## 2022-02-23 PROCEDURE — 99214 PR OFFICE/OUTPT VISIT, EST, LEVL IV, 30-39 MIN: ICD-10-PCS | Mod: 57,25,S$GLB, | Performed by: OBSTETRICS & GYNECOLOGY

## 2022-02-23 PROCEDURE — 99999 PR PBB SHADOW E&M-EST. PATIENT-LVL III: ICD-10-PCS | Mod: PBBFAC,,, | Performed by: OBSTETRICS & GYNECOLOGY

## 2022-02-23 PROCEDURE — 3077F PR MOST RECENT SYSTOLIC BLOOD PRESSURE >= 140 MM HG: ICD-10-PCS | Mod: CPTII,S$GLB,, | Performed by: OBSTETRICS & GYNECOLOGY

## 2022-02-23 PROCEDURE — 99214 OFFICE O/P EST MOD 30 MIN: CPT | Mod: 57,25,S$GLB, | Performed by: OBSTETRICS & GYNECOLOGY

## 2022-02-23 PROCEDURE — 99999 PR PBB SHADOW E&M-EST. PATIENT-LVL III: CPT | Mod: PBBFAC,,, | Performed by: OBSTETRICS & GYNECOLOGY

## 2022-02-23 PROCEDURE — 1160F PR REVIEW ALL MEDS BY PRESCRIBER/CLIN PHARMACIST DOCUMENTED: ICD-10-PCS | Mod: CPTII,S$GLB,, | Performed by: OBSTETRICS & GYNECOLOGY

## 2022-02-23 PROCEDURE — 58100 PR BIOPSY OF UTERUS LINING: ICD-10-PCS | Mod: 53,S$GLB,, | Performed by: OBSTETRICS & GYNECOLOGY

## 2022-02-23 PROCEDURE — 1160F RVW MEDS BY RX/DR IN RCRD: CPT | Mod: CPTII,S$GLB,, | Performed by: OBSTETRICS & GYNECOLOGY

## 2022-02-23 PROCEDURE — 1159F MED LIST DOCD IN RCRD: CPT | Mod: CPTII,S$GLB,, | Performed by: OBSTETRICS & GYNECOLOGY

## 2022-02-23 PROCEDURE — 1159F PR MEDICATION LIST DOCUMENTED IN MEDICAL RECORD: ICD-10-PCS | Mod: CPTII,S$GLB,, | Performed by: OBSTETRICS & GYNECOLOGY

## 2022-02-23 PROCEDURE — 3008F PR BODY MASS INDEX (BMI) DOCUMENTED: ICD-10-PCS | Mod: CPTII,S$GLB,, | Performed by: OBSTETRICS & GYNECOLOGY

## 2022-02-23 PROCEDURE — 3080F DIAST BP >= 90 MM HG: CPT | Mod: CPTII,S$GLB,, | Performed by: OBSTETRICS & GYNECOLOGY

## 2022-02-23 NOTE — PROGRESS NOTES
CC: KIMBERLY    Evie Bishop is a 41 y.o. female  presents with complaint of abnormal uterine bleeding.  She reportsclots,reports heavy bleeding,reports double protection, and denies accidents.         Complaining of: heavy cycles, bleeds uncontrollably for 2 days of every cycle q 25 days, changing supertampon hourly  Doesn't want to take hormones prefers doing an abaltion    Past Medical History:   Diagnosis Date    Tachycardia      Past Surgical History:   Procedure Laterality Date     SECTION      x2    fatty tumor removal      TONSILLECTOMY      TUBAL LIGATION       Social History     Socioeconomic History    Marital status:    Tobacco Use    Smoking status: Never Smoker    Smokeless tobacco: Never Used   Substance and Sexual Activity    Alcohol use: Yes     Comment: rarely    Drug use: No    Sexual activity: Yes     Partners: Male     Birth control/protection: See Surgical Hx     Family History   Problem Relation Age of Onset    Heart disease Paternal Grandmother     Cancer Maternal Grandmother     Uterine cancer Maternal Grandmother     Hypertension Father     Hypertension Mother      OB History        3    Para   3    Term   3            AB        Living   3       SAB        IAB        Ectopic        Multiple   0    Live Births   3                 BP (!) 166/91 (BP Location: Right arm, Patient Position: Sitting)   Wt 66.9 kg (147 lb 6.4 oz)   LMP 2022   BMI 26.11 kg/m²     ROS:  Constitutional: no weight loss, weight gain, fever, fatigue  Eyes:  No vision changes, glasses/contacts  ENT/Mouth: No ulcers, sinus problems, ears ringing, headache  Cardiovascular: No inability to lie flat, chest pain, exercise intolerance, swelling, heart palpitations  Respiratory: No wheezing, coughing blood, shortness of breath, or cough  Gastrointestinal: No diarrhea, bloody stool, nausea/vomiting, constipation, gas, hemorrhoids  Genitourinary: No blood in urine, painful  urination, urgency of urination, frequency of urination, incomplete emptying, incontinence,  painful periods, , vaginal discharge, vaginal odor, painful intercourse, sexual problems, bleeding after intercourse.  Musculoskeletal: No muscle weakness  Skin/Breast: No painful breasts, nipple discharge, masses, rash, ulcers  Neurological: No passing out, seizures, numbness, headache  Endocrine: No diabetes, hypothyroid, hyperthyroid, hot flashes, hair loss, abnormal hair growth, ance  Psychiatric: No depression, crying  Hematologic: No bruises, bleeding, swollen lymph nodes, anemia.      OBJECTIVE:   The patient appears well, alert, oriented x 3, in no distress.  BP (!) 166/91 (BP Location: Right arm, Patient Position: Sitting)   Wt 66.9 kg (147 lb 6.4 oz)   LMP 02/14/2022   BMI 26.11 kg/m²   NECK: no thyromegaly, trachea midline  SKIN: no acne, striae, hirsutism  BREAST EXAM: not examined  ABDOMEN: no hernias, masses, or hepatosplenomegaly  GENITALIA: normal external genitalia, no erythema, no discharge  URETHRA: normal urethra, normal urethral meatus  VAGINA: Normal  CERVIX: no lesions or cervical motion tenderness  UTERUS: anteflexed  ADNEXA: normal adnexa and no mass, fullness, tenderness      ENDOMETRIAL BIOPSY:    The patient was informed of the risk of bleeding, infection, uterine perforation, and pain.  She was counseled that the test will rule-out endometrial cancer with an accuracy greater than 96%.  She was counseled on the alternatives to endometrial biopsy and agrees to proceed.  A time out was performed.    Anterior lip of the cervix was grasped with a single tooth tenaculum.  Pap was not done.  A sterile endometrial pipelle was not inserted into the uterine cavity. Unable to dilate int os to pass pipelle    The patient tolerated the procedure moderately.    All tissue was sent to pathology.  CLINICAL HISTORY:  Irregular menstrual bleeding     TECHNIQUE:  Multiple static ultrasound images are submitted  for interpretation.     COMPARISON:  06/21/2017     FINDINGS:  The uterus measures 8.8 cm in craniocaudal dimension by 5.1 cm in AP dimension by 5.8 cm in mediolateral dimension.  The endometrial stripe thickness measures 13 mm.  There is a possible 13 mm hyperechoic mass in the endometrial canal of the body of the uterus.  There is a small amount of anechoic fluid in the cervical portion of the endometrial canal.  There is a 6 mm cyst in the cervical portion of the uterus.  There are several hypoechoic masses associated with the uterus.  One of the larger ones measures 16 mm and is located in the posterior aspect of the body of the uterus.     The ovaries are normal in appearance.  The left ovary measures 3.5 cm by 1.9 cm x 2.6 cm.  The right ovary measures 2.7 cm x 1.7 cm x 1.7 cm.     The urinary bladder is normal in appearance.  There is no free fluid visualized within the pelvis.     Impression:     1. There is a possible 13 mm hyperechoic mass in the endometrial canal of the body of the uterus.  If additional imaging evaluation is clinically indicated, I recommend consideration of a hysterosonogram.  2. The endometrial stripe thickness measures 13 mm. There is a small amount of anechoic fluid in the cervical portion of the endometrial canal.  3. There are several hypoechoic masses associated with the uterus.  One of the larger ones measures 16 mm and is located in the posterior aspect of the body of the uterus.  These are characteristic of fibroids.  4. There is a 6 mm cyst in the cervical portion of the uterus.  This is characteristic of a nabothian cyst.        Electronically signed by: Dc Chavez MD  Date:                                            02/22/2022  Time:                                           08:54  Lab Results   Component Value Date    WBC 8.01 08/09/2017    HGB 7.3 (L) 08/09/2017    HCT 24.6 (L) 08/09/2017    MCV 68 (L) 08/09/2017     (L) 08/09/2017     ASSESSMENT AND  PLAN:    1. Menorrhagia with regular cycle     2. Cervical stenosis (uterine cervix)     3. Fibroids, submucosal      and plan    Discussed treatment options with patient including OCP's, Mirena IUD, UAE, endometrial ablation, and hysterectomy.  Patient desires D&C Hyst myosure myomectomy and Novasure ablation  Understand w/o embx bf procedure that if path shows hyperplasia will likely need hyst and agrees with that plan.    Consents done procedure scheduled

## 2022-03-14 ENCOUNTER — LAB VISIT (OUTPATIENT)
Dept: FAMILY MEDICINE | Facility: CLINIC | Age: 42
End: 2022-03-14
Payer: COMMERCIAL

## 2022-03-14 DIAGNOSIS — Z01.818 PRE-OP TESTING: ICD-10-CM

## 2022-03-14 PROCEDURE — U0003 INFECTIOUS AGENT DETECTION BY NUCLEIC ACID (DNA OR RNA); SEVERE ACUTE RESPIRATORY SYNDROME CORONAVIRUS 2 (SARS-COV-2) (CORONAVIRUS DISEASE [COVID-19]), AMPLIFIED PROBE TECHNIQUE, MAKING USE OF HIGH THROUGHPUT TECHNOLOGIES AS DESCRIBED BY CMS-2020-01-R: HCPCS | Performed by: FAMILY MEDICINE

## 2022-03-14 PROCEDURE — U0005 INFEC AGEN DETEC AMPLI PROBE: HCPCS | Performed by: FAMILY MEDICINE

## 2022-03-15 LAB
SARS-COV-2 RNA RESP QL NAA+PROBE: NOT DETECTED
SARS-COV-2- CYCLE NUMBER: NORMAL

## 2022-03-17 PROBLEM — N92.0 MENORRHAGIA WITH REGULAR CYCLE: Status: ACTIVE | Noted: 2022-03-17

## 2022-04-28 ENCOUNTER — OFFICE VISIT (OUTPATIENT)
Dept: OBSTETRICS AND GYNECOLOGY | Facility: CLINIC | Age: 42
End: 2022-04-28
Payer: COMMERCIAL

## 2022-04-28 VITALS
BODY MASS INDEX: 25.51 KG/M2 | DIASTOLIC BLOOD PRESSURE: 85 MMHG | WEIGHT: 148.63 LBS | SYSTOLIC BLOOD PRESSURE: 130 MMHG

## 2022-04-28 DIAGNOSIS — Z98.890 POSTOPERATIVE STATE: Primary | ICD-10-CM

## 2022-04-28 PROCEDURE — 99999 PR PBB SHADOW E&M-EST. PATIENT-LVL III: ICD-10-PCS | Mod: PBBFAC,,, | Performed by: OBSTETRICS & GYNECOLOGY

## 2022-04-28 PROCEDURE — 99024 POSTOP FOLLOW-UP VISIT: CPT | Mod: S$GLB,,, | Performed by: OBSTETRICS & GYNECOLOGY

## 2022-04-28 PROCEDURE — 3075F SYST BP GE 130 - 139MM HG: CPT | Mod: CPTII,S$GLB,, | Performed by: OBSTETRICS & GYNECOLOGY

## 2022-04-28 PROCEDURE — 1160F RVW MEDS BY RX/DR IN RCRD: CPT | Mod: CPTII,S$GLB,, | Performed by: OBSTETRICS & GYNECOLOGY

## 2022-04-28 PROCEDURE — 3079F PR MOST RECENT DIASTOLIC BLOOD PRESSURE 80-89 MM HG: ICD-10-PCS | Mod: CPTII,S$GLB,, | Performed by: OBSTETRICS & GYNECOLOGY

## 2022-04-28 PROCEDURE — 3075F PR MOST RECENT SYSTOLIC BLOOD PRESS GE 130-139MM HG: ICD-10-PCS | Mod: CPTII,S$GLB,, | Performed by: OBSTETRICS & GYNECOLOGY

## 2022-04-28 PROCEDURE — 99999 PR PBB SHADOW E&M-EST. PATIENT-LVL III: CPT | Mod: PBBFAC,,, | Performed by: OBSTETRICS & GYNECOLOGY

## 2022-04-28 PROCEDURE — 3008F BODY MASS INDEX DOCD: CPT | Mod: CPTII,S$GLB,, | Performed by: OBSTETRICS & GYNECOLOGY

## 2022-04-28 PROCEDURE — 1159F PR MEDICATION LIST DOCUMENTED IN MEDICAL RECORD: ICD-10-PCS | Mod: CPTII,S$GLB,, | Performed by: OBSTETRICS & GYNECOLOGY

## 2022-04-28 PROCEDURE — 1160F PR REVIEW ALL MEDS BY PRESCRIBER/CLIN PHARMACIST DOCUMENTED: ICD-10-PCS | Mod: CPTII,S$GLB,, | Performed by: OBSTETRICS & GYNECOLOGY

## 2022-04-28 PROCEDURE — 1159F MED LIST DOCD IN RCRD: CPT | Mod: CPTII,S$GLB,, | Performed by: OBSTETRICS & GYNECOLOGY

## 2022-04-28 PROCEDURE — 3008F PR BODY MASS INDEX (BMI) DOCUMENTED: ICD-10-PCS | Mod: CPTII,S$GLB,, | Performed by: OBSTETRICS & GYNECOLOGY

## 2022-04-28 PROCEDURE — 3079F DIAST BP 80-89 MM HG: CPT | Mod: CPTII,S$GLB,, | Performed by: OBSTETRICS & GYNECOLOGY

## 2022-04-28 PROCEDURE — 99024 PR POST-OP FOLLOW-UP VISIT: ICD-10-PCS | Mod: S$GLB,,, | Performed by: OBSTETRICS & GYNECOLOGY

## 2022-11-29 ENCOUNTER — CLINICAL SUPPORT (OUTPATIENT)
Dept: CARDIOLOGY | Facility: CLINIC | Age: 42
End: 2022-11-29
Attending: STUDENT IN AN ORGANIZED HEALTH CARE EDUCATION/TRAINING PROGRAM
Payer: COMMERCIAL

## 2022-11-29 DIAGNOSIS — R00.2 HEART PALPITATIONS: ICD-10-CM

## 2022-11-29 PROCEDURE — 93010 ELECTROCARDIOGRAM REPORT: CPT | Mod: ,,, | Performed by: INTERNAL MEDICINE

## 2022-11-29 PROCEDURE — 93010 PR ELECTROCARDIOGRAM REPORT: ICD-10-PCS | Mod: ,,, | Performed by: INTERNAL MEDICINE

## 2022-12-06 LAB
OHS CV EVENT MONITOR DAY: 0
OHS CV HOLTER LENGTH DECIMAL HOURS: 48
OHS CV HOLTER LENGTH HOURS: 48
OHS CV HOLTER LENGTH MINUTES: 0
OHS CV HOLTER SINUS AVERAGE HR: 70
OHS CV HOLTER SINUS MAX HR: 111
OHS CV HOLTER SINUS MIN HR: 47

## 2022-12-06 PROCEDURE — 93227 XTRNL ECG REC<48 HR R&I: CPT | Mod: S$GLB,,, | Performed by: INTERNAL MEDICINE

## 2022-12-06 PROCEDURE — 93227 HOLTER MONITOR - 48 HOUR (CUPID ONLY): ICD-10-PCS | Mod: S$GLB,,, | Performed by: INTERNAL MEDICINE

## 2022-12-29 ENCOUNTER — OUTSIDE PLACE OF SERVICE (OUTPATIENT)
Dept: CARDIOLOGY | Facility: CLINIC | Age: 42
End: 2022-12-29
Payer: COMMERCIAL

## 2023-05-11 NOTE — PROGRESS NOTES
Doing ok  Baby active    XY  Taking daily prenatal for anemia  Will begin Rhogam at 28w  OB glucose screen at next visit  Pending cardiology visit for flutters/anxiety  Follow-up in 3 wk  
Yes

## 2023-06-14 ENCOUNTER — PATIENT MESSAGE (OUTPATIENT)
Dept: OBSTETRICS AND GYNECOLOGY | Facility: CLINIC | Age: 43
End: 2023-06-14
Payer: COMMERCIAL

## 2023-06-14 DIAGNOSIS — Z12.31 ENCOUNTER FOR SCREENING MAMMOGRAM FOR BREAST CANCER: Primary | ICD-10-CM

## 2023-07-13 ENCOUNTER — HOSPITAL ENCOUNTER (OUTPATIENT)
Dept: RADIOLOGY | Facility: HOSPITAL | Age: 43
Discharge: HOME | End: 2023-07-13
Attending: OBSTETRICS & GYNECOLOGY
Payer: COMMERCIAL

## 2023-07-13 DIAGNOSIS — Z12.31 ENCOUNTER FOR SCREENING MAMMOGRAM FOR BREAST CANCER: ICD-10-CM

## 2023-07-13 PROCEDURE — 77067 SCR MAMMO BI INCL CAD: CPT | Mod: TC,PO

## 2023-07-13 PROCEDURE — 77067 SCR MAMMO BI INCL CAD: CPT | Mod: 26,,, | Performed by: RADIOLOGY

## 2023-07-13 PROCEDURE — 77063 MAMMO DIGITAL SCREENING BILAT WITH TOMO: ICD-10-PCS | Mod: 26,,, | Performed by: RADIOLOGY

## 2023-07-13 PROCEDURE — 77063 BREAST TOMOSYNTHESIS BI: CPT | Mod: 26,,, | Performed by: RADIOLOGY

## 2023-07-13 PROCEDURE — 77067 MAMMO DIGITAL SCREENING BILAT WITH TOMO: ICD-10-PCS | Mod: 26,,, | Performed by: RADIOLOGY

## 2024-01-19 ENCOUNTER — LAB VISIT (OUTPATIENT)
Dept: LAB | Facility: HOSPITAL | Age: 44
End: 2024-01-19
Payer: COMMERCIAL

## 2024-01-19 DIAGNOSIS — I10 ESSENTIAL HYPERTENSION, MALIGNANT: ICD-10-CM

## 2024-01-19 DIAGNOSIS — R23.3 SPONTANEOUS ECCHYMOSES: Primary | ICD-10-CM

## 2024-01-19 LAB
ALBUMIN SERPL BCP-MCNC: 4.2 G/DL (ref 3.5–5.2)
ALP SERPL-CCNC: 73 U/L (ref 38–126)
ALT SERPL W/O P-5'-P-CCNC: 17 U/L (ref 10–44)
ANION GAP SERPL CALC-SCNC: 8 MMOL/L (ref 8–16)
APTT PPP: 28.9 SEC (ref 21–32)
AST SERPL-CCNC: 23 U/L (ref 15–46)
BASOPHILS # BLD AUTO: 0.01 K/UL (ref 0–0.2)
BASOPHILS NFR BLD: 0.3 % (ref 0–1.9)
BILIRUB SERPL-MCNC: 0.4 MG/DL (ref 0.1–1)
CALCIUM SERPL-MCNC: 9.4 MG/DL (ref 8.7–10.5)
CHLORIDE SERPL-SCNC: 101 MMOL/L (ref 95–110)
CO2 SERPL-SCNC: 30 MMOL/L (ref 23–29)
CREAT SERPL-MCNC: 0.62 MG/DL (ref 0.5–1.4)
DIFFERENTIAL METHOD BLD: ABNORMAL
EOSINOPHIL # BLD AUTO: 0.1 K/UL (ref 0–0.5)
EOSINOPHIL NFR BLD: 4.3 % (ref 0–8)
ERYTHROCYTE [DISTWIDTH] IN BLOOD BY AUTOMATED COUNT: 13 % (ref 11.5–14.5)
EST. GFR  (NO RACE VARIABLE): >60 ML/MIN/1.73 M^2
GLUCOSE SERPL-MCNC: 84 MG/DL (ref 70–110)
HCT VFR BLD AUTO: 43.1 % (ref 37–48.5)
HGB BLD-MCNC: 14.3 G/DL (ref 12–16)
IMM GRANULOCYTES # BLD AUTO: 0.01 K/UL (ref 0–0.04)
IMM GRANULOCYTES NFR BLD AUTO: 0.3 % (ref 0–0.5)
INR PPP: 0.9 (ref 0.8–1.2)
LYMPHOCYTES # BLD AUTO: 0.8 K/UL (ref 1–4.8)
LYMPHOCYTES NFR BLD: 28 % (ref 18–48)
MCH RBC QN AUTO: 28.8 PG (ref 27–31)
MCHC RBC AUTO-ENTMCNC: 33.2 G/DL (ref 32–36)
MCV RBC AUTO: 87 FL (ref 82–98)
MONOCYTES # BLD AUTO: 0.4 K/UL (ref 0.3–1)
MONOCYTES NFR BLD: 12.7 % (ref 4–15)
NEUTROPHILS # BLD AUTO: 1.6 K/UL (ref 1.8–7.7)
NEUTROPHILS NFR BLD: 54.4 % (ref 38–73)
NRBC BLD-RTO: 0 /100 WBC
PLATELET # BLD AUTO: 173 K/UL (ref 150–450)
PMV BLD AUTO: 10.7 FL (ref 9.2–12.9)
POTASSIUM SERPL-SCNC: 4.1 MMOL/L (ref 3.5–5.1)
PROT SERPL-MCNC: 6.8 G/DL (ref 6–8.4)
PROTHROMBIN TIME: 9.8 SEC (ref 9–12.5)
RBC # BLD AUTO: 4.97 M/UL (ref 4–5.4)
SODIUM SERPL-SCNC: 139 MMOL/L (ref 136–145)
UUN UR-MCNC: 8 MG/DL (ref 7–17)
WBC # BLD AUTO: 3 K/UL (ref 3.9–12.7)

## 2024-01-19 PROCEDURE — 80053 COMPREHEN METABOLIC PANEL: CPT | Mod: PN

## 2024-01-19 PROCEDURE — 85025 COMPLETE CBC W/AUTO DIFF WBC: CPT | Mod: PN

## 2024-01-19 PROCEDURE — 85730 THROMBOPLASTIN TIME PARTIAL: CPT | Mod: PN

## 2024-01-19 PROCEDURE — 85610 PROTHROMBIN TIME: CPT | Mod: PN

## 2024-01-19 PROCEDURE — 36415 COLL VENOUS BLD VENIPUNCTURE: CPT | Mod: PN
